# Patient Record
Sex: MALE | Race: AMERICAN INDIAN OR ALASKA NATIVE
[De-identification: names, ages, dates, MRNs, and addresses within clinical notes are randomized per-mention and may not be internally consistent; named-entity substitution may affect disease eponyms.]

---

## 2018-06-16 ENCOUNTER — HOSPITAL ENCOUNTER (EMERGENCY)
Dept: HOSPITAL 5 - ED | Age: 68
Discharge: HOME | End: 2018-06-16
Payer: COMMERCIAL

## 2018-06-16 VITALS — SYSTOLIC BLOOD PRESSURE: 118 MMHG | DIASTOLIC BLOOD PRESSURE: 64 MMHG
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
## 2018-06-16 DIAGNOSIS — J44.9: ICD-10-CM

## 2018-06-16 DIAGNOSIS — S00.83XA: Primary | ICD-10-CM
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
## 2018-06-16 LAB
BASOPHILS # (AUTO): 0 K/MM3 (ref 0–0.1)
BASOPHILS NFR BLD AUTO: 0.2 % (ref 0–1.8)
BUN SERPL-MCNC: 12 MG/DL (ref 9–20)
BUN/CREAT SERPL: 17 %
CALCIUM SERPL-MCNC: 9.3 MG/DL (ref 8.4–10.2)
EOSINOPHIL # BLD AUTO: 0 K/MM3 (ref 0–0.4)
EOSINOPHIL NFR BLD AUTO: 0.7 % (ref 0–4.3)
HCT VFR BLD CALC: 43.3 % (ref 35.5–45.6)
HEMOLYSIS INDEX: 15
HGB BLD-MCNC: 14.1 GM/DL (ref 11.8–15.2)
LYMPHOCYTES # BLD AUTO: 1.5 K/MM3 (ref 1.2–5.4)
LYMPHOCYTES NFR BLD AUTO: 23.9 % (ref 13.4–35)
MCH RBC QN AUTO: 32 PG (ref 28–32)
MCHC RBC AUTO-ENTMCNC: 33 % (ref 32–34)
MCV RBC AUTO: 97 FL (ref 84–94)
MONOCYTES # (AUTO): 0.6 K/MM3 (ref 0–0.8)
MONOCYTES % (AUTO): 10.2 % (ref 0–7.3)
PLATELET # BLD: 145 K/MM3 (ref 140–440)
RBC # BLD AUTO: 4.45 M/MM3 (ref 3.65–5.03)

## 2018-06-16 PROCEDURE — 80048 BASIC METABOLIC PNL TOTAL CA: CPT

## 2018-06-16 PROCEDURE — 70450 CT HEAD/BRAIN W/O DYE: CPT

## 2018-06-16 PROCEDURE — 90471 IMMUNIZATION ADMIN: CPT

## 2018-06-16 PROCEDURE — 71046 X-RAY EXAM CHEST 2 VIEWS: CPT

## 2018-06-16 PROCEDURE — 70486 CT MAXILLOFACIAL W/O DYE: CPT

## 2018-06-16 PROCEDURE — 93010 ELECTROCARDIOGRAM REPORT: CPT

## 2018-06-16 PROCEDURE — 93005 ELECTROCARDIOGRAM TRACING: CPT

## 2018-06-16 PROCEDURE — 99285 EMERGENCY DEPT VISIT HI MDM: CPT

## 2018-06-16 PROCEDURE — 85025 COMPLETE CBC W/AUTO DIFF WBC: CPT

## 2018-06-16 PROCEDURE — 94644 CONT INHLJ TX 1ST HOUR: CPT

## 2018-06-16 PROCEDURE — 36415 COLL VENOUS BLD VENIPUNCTURE: CPT

## 2018-06-16 PROCEDURE — 90715 TDAP VACCINE 7 YRS/> IM: CPT

## 2018-06-16 PROCEDURE — 82803 BLOOD GASES ANY COMBINATION: CPT

## 2018-06-16 NOTE — EMERGENCY DEPARTMENT REPORT
ED General Adult HPI





- General


Chief complaint: Dyspnea/Respdistress


Stated complaint: DIFFICULTY IN BREATHING


Time Seen by Provider: 18 08:13


Source: patient, EMS (ems notes not available at time of  chart dictation), RN 

notes reviewed


Mode of arrival: Stretcher


Limitations: No Limitations





- History of Present Illness


Initial comments: 





This is a 67-year-old male who is unknown to this provider previously, oxygen 

dependent, has a past medical history of COPD, hypertension.  Patient presents 

to the ER with complaint of lip pain and swelling after mechanical fall 

yesterday.  He denies severe headache, chest pain, abdominal pain, midline neck 

pain, weakness, numbness.  He has chronic shortness of breath, which is not a 

new, worsening or different.





He denies DVT, pulmonary embolus risk factors.





he has chronic cough which is not a new, worsening or different, and he is not 

bringing up any significant mucus


-: Sudden


Location: face, mouth


Quality: aching


Consistency: constant


Improves with: rest


Worsens with: movement


Associated Symptoms: cough, shortness of breath.  denies: confusion, chest pain

, diaphoresis, fever/chills, headaches, loss of appetite, malaise, nausea/

vomiting, rash, seizure, syncope, weakness





- Related Data


 Previous Rx's











 Medication  Instructions  Recorded  Last Taken  Type


 


Acetaminophen [Tylenol Arthritis] 650 mg PO Q6HR PRN #30 tablet.er 18 

Unknown Rx


 


Albuterol Sulfate [Proair 90 mcg IH Q4HR PRN #2 aer.pow.ba 18 Unknown Rx





Respiclick]    


 


Bacitracin Zinc Oint [Antibiotic 1 applicatio TP BID #1 tube 18 Unknown Rx





Oint]    


 


Ibuprofen [Motrin] 600 mg PO Q8H PRN #30 tablet 18 Unknown Rx


 


Ipratropium Bromide [Atrovent Hfa] 12.9 gm IH Q4HR #2 hfa.aer.ad 18 

Unknown Rx











 Allergies











Allergy/AdvReac Type Severity Reaction Status Date / Time


 


No Known Allergies Allergy   Unverified 18 06:06














ED Review of Systems


ROS: 


Stated complaint: DIFFICULTY IN BREATHING


Other details as noted in HPI





Comment: All other systems reviewed and negative





ED Past Medical Hx





- Social History


Smoking Status: Unknown if ever smoked


Substance Use Type: None





- Medications


Home Medications: 


 Home Medications











 Medication  Instructions  Recorded  Confirmed  Last Taken  Type


 


Acetaminophen [Tylenol Arthritis] 650 mg PO Q6HR PRN #30 tablet.er 18  

Unknown Rx


 


Albuterol Sulfate [Proair 90 mcg IH Q4HR PRN #2 aer.pow.ba 18  Unknown Rx





Respiclick]     


 


Bacitracin Zinc Oint [Antibiotic 1 applicatio TP BID #1 tube 18  Unknown 

Rx





Oint]     


 


Ibuprofen [Motrin] 600 mg PO Q8H PRN #30 tablet 18  Unknown Rx


 


Ipratropium Bromide [Atrovent Hfa] 12.9 gm IH Q4HR #2 hfa.aer.ad 18  

Unknown Rx














ED Physical Exam





- General


Limitations: No Limitations


General appearance: alert, in no apparent distress





- Head


Head exam: Present: atraumatic, normocephalic





- Eye


Eye exam: Present: normal appearance, PERRL, EOMI.  Absent: nystagmus





- ENT


ENT exam: Present: mucous membranes moist, TM's normal bilaterally, normal 

external ear exam, other (there is no mastoid tenderness.  Tympanic membranes 

clear on the right side, left external auditory canal is obscured by wax.).  

Absent: normal exam (superficial superior and inferior lip swelling is noted.  

No obvious laceration.  No induration.  Crust-like discharge noted on the 

superior frenulum.), normal orophraynx





- Neck


Neck exam: Present: normal inspection, full ROM.  Absent: tenderness, 

meningismus





- Respiratory


Respiratory exam: Present: normal lung sounds bilaterally, decreased breath 

sounds.  Absent: respiratory distress, wheezes, rales, rhonchi, stridor





- Cardiovascular


Cardiovascular Exam: Present: regular rate, normal rhythm, normal heart sounds.

  Absent: bradycardia, tachycardia, irregular rhythm, systolic murmur, 

diastolic murmur, rubs, gallop





- GI/Abdominal


GI/Abdominal exam: Present: soft, normal bowel sounds.  Absent: distended, 

tenderness, guarding, rebound, rigid, pulsatile mass





- Rectal


Rectal exam: Present: deferred





- Extremities Exam


Extremities exam: Present: normal inspection, full ROM, normal capillary refill

, other (there is no palpable cord.  There is negative Homans sign.).  Absent: 

pedal edema, calf tenderness





- Back Exam


Back exam: Present: normal inspection, full ROM.  Absent: tenderness, CVA 

tenderness (R), paraspinal tenderness, vertebral tenderness





- Neurological Exam


Neurological exam: Present: alert, oriented X3, CN II-XII intact, normal gait, 

other (Extraocular movements intact.  Tongue midline.  No facial droop.  Facial 

sensation intact to light touch in the V1, V2, V3 distribution bilaterally.  5 

and 5 strength in 4 extremities..  Sensation is intact to light touch in 4 

extremities.).  Absent: motor sensory deficit





- Psychiatric


Psychiatric exam: Present: normal affect, normal mood





- Skin


Skin exam: Present: warm, dry, intact, normal color.  Absent: rash





ED Course


 Vital Signs











  18





  05:30 05:46 06:00


 


Temperature   


 


Pulse Rate  72 80


 


Pulse Rate [   





Anterior   





Bilateral   





Throughout]   


 


Respiratory  22 27 H





Rate   


 


Respiratory   





Rate [Anterior   





Bilateral   





Throughout]   


 


Blood Pressure  162/66 163/65


 


Blood Pressure   





[Left]   


 


O2 Sat by Pulse 96 97 96





Oximetry   














  18





  06:16 06:30 06:46


 


Temperature   


 


Pulse Rate 84 81 


 


Pulse Rate [   





Anterior   





Bilateral   





Throughout]   


 


Respiratory 28 H 21 17





Rate   


 


Respiratory   





Rate [Anterior   





Bilateral   





Throughout]   


 


Blood Pressure 163/65 163/65 


 


Blood Pressure   





[Left]   


 


O2 Sat by Pulse 97 95 100





Oximetry   














  18





  06:58 06:59 07:39


 


Temperature 97.8 F  98.5 F


 


Pulse Rate  102 H 103 H


 


Pulse Rate [   





Anterior   





Bilateral   





Throughout]   


 


Respiratory   32 H





Rate   


 


Respiratory   





Rate [Anterior   





Bilateral   





Throughout]   


 


Blood Pressure   


 


Blood Pressure   117/85





[Left]   


 


O2 Sat by Pulse   100





Oximetry   














  18





  09:10 10:02


 


Temperature  


 


Pulse Rate  


 


Pulse Rate [ 86 102 H





Anterior  





Bilateral  





Throughout]  


 


Respiratory  





Rate  


 


Respiratory 20 20





Rate [Anterior  





Bilateral  





Throughout]  


 


Blood Pressure  


 


Blood Pressure  





[Left]  


 


O2 Sat by Pulse  





Oximetry  














- Reevaluation(s)


Reevaluation #1: 





18 10:11


Differential diagnosis, including not limited to: Chronic COPD, contusions, 

intracranial injury, facial fracture














Assessment and plan: This 67-year-old male with a primary complaint of 

traumatic lip swelling.  He is afebrile with reassuring vital signs, has a GCS 

of 15, with an NIH score of 0 and is clinically sober.Patient is clinically 

sober at this time.  The cervical spine is cleared through nexus and [place] c 

spine rule








Noncontrast CT scan of the brain and face did not demonstrate any significant 

traumatic abnormalities that would require transfer or emergent surgical 

intervention.  The patient is not having left-sided ear pain or left-sided 

mastoid tenderness, bilateral mastoids are nontender, clinically therefore 

patient's does not have mastoiditis.








The patient is chronically short of breath but is not acutely decompensated, 

his arterial blood gas to me showed mild hypercapnia but he was not 

encephalopathic, and he is not wheezing.  The patient will be discharged with 

as needed albuterol, Atrovent, pain medication, warm compresses, and 

bacitracin.  He was also given an a tetanus vaccination.  He can follow up with 

an outpatient ENT or facial plastics person for his multiple incidental CT scan 

findings.

















ED Medical Decision Making





- Lab Data


Result diagrams: 


 18 06:24





 18 06:24








 Vital Signs











  18





  05:30 05:46 06:00


 


Temperature   


 


Pulse Rate  72 80


 


Pulse Rate [   





Anterior   





Bilateral   





Throughout]   


 


Respiratory  22 27 H





Rate   


 


Respiratory   





Rate [Anterior   





Bilateral   





Throughout]   


 


Blood Pressure  162/66 163/65


 


Blood Pressure   





[Left]   


 


O2 Sat by Pulse 96 97 96





Oximetry   














  18





  06:16 06:30 06:46


 


Temperature   


 


Pulse Rate 84 81 


 


Pulse Rate [   





Anterior   





Bilateral   





Throughout]   


 


Respiratory 28 H 21 17





Rate   


 


Respiratory   





Rate [Anterior   





Bilateral   





Throughout]   


 


Blood Pressure 163/65 163/65 


 


Blood Pressure   





[Left]   


 


O2 Sat by Pulse 97 95 100





Oximetry   














  18





  06:58 06:59 07:39


 


Temperature 97.8 F  98.5 F


 


Pulse Rate  102 H 103 H


 


Pulse Rate [   





Anterior   





Bilateral   





Throughout]   


 


Respiratory   32 H





Rate   


 


Respiratory   





Rate [Anterior   





Bilateral   





Throughout]   


 


Blood Pressure   


 


Blood Pressure   117/85





[Left]   


 


O2 Sat by Pulse   100





Oximetry   














  18





  09:10 10:02


 


Temperature  


 


Pulse Rate  


 


Pulse Rate [ 86 102 H





Anterior  





Bilateral  





Throughout]  


 


Respiratory  





Rate  


 


Respiratory 20 20





Rate [Anterior  





Bilateral  





Throughout]  


 


Blood Pressure  


 


Blood Pressure  





[Left]  


 


O2 Sat by Pulse  





Oximetry  











 Lab Results











  18 Range/Units





  06:24 06:24 09:15 


 


WBC   6.4   (4.5-11.0)  K/mm3


 


RBC   4.45   (3.65-5.03)  M/mm3


 


Hgb   14.1   (11.8-15.2)  gm/dl


 


Hct   43.3   (35.5-45.6)  %


 


MCV   97 H   (84-94)  fl


 


MCH   32   (28-32)  pg


 


MCHC   33   (32-34)  %


 


RDW   12.6 L   (13.2-15.2)  %


 


Plt Count   145   (140-440)  K/mm3


 


Lymph % (Auto)   23.9   (13.4-35.0)  %


 


Mono % (Auto)   10.2 H   (0.0-7.3)  %


 


Eos % (Auto)   0.7   (0.0-4.3)  %


 


Baso % (Auto)   0.2   (0.0-1.8)  %


 


Lymph #   1.5   (1.2-5.4)  K/mm3


 


Mono #   0.6   (0.0-0.8)  K/mm3


 


Eos #   0.0   (0.0-0.4)  K/mm3


 


Baso #   0.0   (0.0-0.1)  K/mm3


 


Seg Neutrophils %   65.0   (40.0-70.0)  %


 


Seg Neutrophils #   4.1   (1.8-7.7)  K/mm3


 


POC ABG pH    7.389  (7.35-7.45)  


 


POC ABG pCO2    61.2 H  (35-45)  


 


POC ABG pO2    91  ()  


 


POC ABG HCO3    37.0  


 


POC ABG Total CO2    39  


 


POC ABG O2 Sat    97  


 


POC ABG Base Excess    12  


 


FiO2    32  %


 


Sodium  138    (137-145)  mmol/L


 


Potassium  4.0    (3.6-5.0)  mmol/L


 


Chloride  94.4 L    ()  mmol/L


 


Carbon Dioxide  35 H    (22-30)  mmol/L


 


Anion Gap  13    mmol/L


 


BUN  12    (9-20)  mg/dL


 


Creatinine  0.7 L    (0.8-1.5)  mg/dL


 


Estimated GFR  > 60    ml/min


 


BUN/Creatinine Ratio  17    %


 


Glucose  98    ()  mg/dL


 


Calcium  9.3    (8.4-10.2)  mg/dL














- EKG Data


-: EKG Interpreted by Me





- EKG Data


When compared to previous EKG there are: previous EKG unavailable





18 10:11


Normal sinus, 99 bpm, left axis deviation, a chill enlargement, motion artifact

, left anterior fascicular block, abnormal EKG, not a stemi





- Radiology Data


Radiology results: report reviewed, image reviewed





Print Report


Referring Physician:   ANGELA CAM


Patient Name:   MERCED ALANIS


Patient ID:   [id number]


YOB: 1950


Sex:   Male


Accession:   [id number]


Report Date:   2018


Report Status:   Finalized


Findings


[facility] 


[address] [place] [address] 





Cat Scan Report 


Signed 





Patient: MERCED ALANIS MR#: [id number] 


: 1950 Acct:[id number] 


Age/Sex: 67 / M ADM Date: 18 


Loc: ED 


Attending Dr: 








Ordering Physician: ANGELA CAM MD 


Date of Service: 18 


Procedure(s): CT head/brain wo con 


Accession Number(s): [id number] 





cc: ANGELA CAM MD 








CT HEAD WITHOUT CONTRAST 





INDICATION: Fall. 





COMPARISON: None similar. 





FINDINGS: Noncontrast head CT demonstrates normal ventricles and sulci 


without acute infarct, hemorrhage, mass effect or midline shift. No 


abnormal extra axial fluid collections. Mild periventricular and few 


white matter hypodense small vessel ischemic disease. Grossly normal 


posterior fossa with preserved basilar cisterns. 





Normal imaged eye globes. Clear paranasal sinuses and right mastoid 


air cells. Extensive left mastoid air cell opacification. Mild 


atherosclerotic ICA calcifications. Nasal soft tissue swelling though 


possible with few small nonspecific radiodensities/possible foreign 


bodies, amongst others, as on axial series 3, images 3-5. Intact 


calvarium. Normal scalp. Edentulous jaw. Cervical spondylosis. 





CONCLUSION: Severe left mastoiditis without acute intracranial CT 


abnormality, as described. Nasal injury suspected, as described. 


Please correlate. 





Thank you for the opportunity to participate in this patient's care. 





Transcribed By: RS 


Dictated By: RACIEL TAPIA MD 


Electronically Authenticated By: RACIEL TAPIA MD 


Signed Date/Time: 18 0916 





Print Report


Referring Physician:   ANGELA CAM


Patient Name:   MERCED ALANIS


Patient ID:   [id number]


YOB: 1950


Sex:   Male


Accession:   [id number]


Report Date:   2018


Report Status:   Finalized


Findings


[facility] 


[address] [place] [id number] 





Cat Scan Report 


Signed 





Patient: MERCED ALANIS MR#: [id number] 


: 1950 Acct:[id number] 


Age/Sex: 67 / M ADM Date: 18 


Loc: ED 


Attending Dr: 








Ordering Physician: ANGELA CAM MD 


Date of Service: 18 


Procedure(s): CT facial bones wo con 


Accession Number(s): [id number] 





cc: ANGELA CAM MD 








CT FACIAL BONES WITHOUT CONTRAST: 





INDICATION: Fall. Facial trauma, swelling. 





COMPARISON: None similar. 





FINDINGS: Noncontrast axial, sagittal and coronal CT reconstructions 


through the face suggests soft tissue swelling of the nose and upper 


lip/anterior face, right slightly more than left. Numerous nonspecific 


superficial subcutaneous and bilateral malar 


hyperdensities/calcifications incidentally noted as on axial series 2, 


images 13-40. Few small nasal soft tissue radiodensities as on axial 


images 38 and 39 also nonspecific. Nasal bone grossly intact as also 


the remainder facial bones. Preserved airway. Mild left maxillary 


sinus mucosal nasal thickening inferiorly. Clear remainder anterior 


paranasal sinuses and right mastoid air cells. Left mastoid air cells 


completely opacified with minimal mucosal thickening in the left middle 


ear posteriorly also possible as on axial image 83, series 3. Normal 


eye globes and imaged intracranial appearance. Slight leftward nasal 


septal deviation and approximately 4 mm leftward nasal septal spur as 


on coronal image 41. Patent ostiomeatal complexes. Intact TMJs. 


Cervical spondylosis. 





CONCLUSION: No acute significantly displaced facial fractures, though 


nose/anterior face soft tissue posttraumatic swelling suspected with 


severe left mastoiditis/slight otitis media and mild left maxillary 


sinus mucosal thickening incidentally noted, as described. Please 


correlate. 





Thank you for the opportunity to participate in this patient's care. 





Transcribed By: RS 


Dictated By: RACIEL TAPIA MD 


Electronically Authenticated By: RACIEL TAPIA MD 


Signed Date/Time: 18 











DD/DT: 18 


TD/TT: 18





Print Report


Referring Physician:   ED DOC


Patient Name:   MERCED ALANIS


Patient ID:   [id number]


YOB: 1950


Sex:   Male


Accession:   [id number]


Report Date:   2018


Report Status:   Finalized


Findings


[facility] 


[address] [place] [address] 





XRay Report 


Signed 





Patient: MERCED ALANIS MR#: [id number] 


: 1950 Acct:[id number] 


Age/Sex: 67 / M ADM Date: 18 


Loc: ED 


Attending Dr: 








Ordering Physician: SHERITA CONNORS MD 


Date of Service: 18 


Procedure(s): XR chest routine 2V 


Accession Number(s): [id number] 





cc: SHERITA CONNORS MD 





Fluoro Time In Minutes: 





FINAL REPORT 





PROCEDURE: XR CHEST ROUTINE 2V 





TECHNIQUE: PA and lateral chest radiographs were obtained. CPT 


72794 











HISTORY: Shortness of breath 





COMPARISON: No prior studies are available for comparison. 





FINDINGS: 


Heart: Normal. 





Mediastinum/Vessels: Normal. 





Lungs/Pleural space: Normal. 





Bony thorax: No acute osseous abnormality. 





Other: 





IMPRESSION: 


There is no evidence of an acute cardiopulmonary process.. 











Transcribed By: [facility] 


Dictated By: MAIRA ABEL MD 


Electronically Authenticated By: MAIRA ABEL MD 


Signed Date/Time: 18 











Critical care attestation.: 


If time is entered above; I have spent that time in minutes in the direct care 

of this critically ill patient, excluding procedure time.








ED Disposition


Clinical Impression: 


 History of COPD, Facial contusion





Disposition: DC-01 TO HOME OR SELFCARE


Is pt being admited?: No


Does the pt Need Aspirin: No


Condition: Stable


Instructions:  Chronic Bronchitis (ED), Contusion in Adults (ED)


Additional Instructions: 


Rest, and avoid heavy lifting and avoid strenuous physical activity.  Continue 

home oxygen therapy, take the medications as needed/directed.  Apply warm 

compresses to swollen lip.  Use albuterol, Atrovent as needed for cough, 

wheezing, shortness of breath as directed.  CT scan of the brain, facial bones 

demonstrated no fracture or significant injury, however multiple incidental 

nonemergent findings were noted, which should be followed up within the next 4-

6 weeks.  Therefore, follow-up with either a facial plastic surgeon, or 

otolaryngology specialist for these findings within the recommended timeframe.


Dr. Tracey is a local otolaryngologist.


Dr. Christensen is a local plastic surgeon.


Follow up with a pulmonary specialist within the next 4-6 weeks.  Dr. Espinoza is 

a local pulmonary specialist.  Follow-up with primary care doctor within the 

next 4-6 weeks.  Dr. Ackerman is a local primary care doctor.














Return to the ER right away with new pain, worsened pain, migration of pain, 

fevers, chills, lethargy, irritability, projectile vomiting, change in mental 

status, confusion, inability to tolerate liquid feeds.























Referrals: 


PRIMARY CARE,MD [Primary Care Provider] - 3-5 Days


SRINIVASAN PETERSEN MD [Staff Physician] - 3-5 Days


NATALYA CHRISTENSEN MD [Staff Physician] - 3-5 Days


DIANE ACKERMAN MD [Staff Physician] - 3-5 Days


ROMAN ESPINOZA MD [Staff Physician] - 3-5 Days

## 2018-06-16 NOTE — CAT SCAN REPORT
CT HEAD WITHOUT CONTRAST



INDICATION: Fall.



COMPARISON: None similar.



FINDINGS: Noncontrast head CT demonstrates normal ventricles and sulci 

without acute infarct, hemorrhage, mass effect or midline shift.  No 

abnormal extra axial fluid collections.  Mild periventricular and few 

white matter hypodense small vessel ischemic disease.  Grossly normal 

posterior fossa with preserved basilar cisterns.



Normal imaged eye globes.  Clear paranasal sinuses and right mastoid 

air cells.  Extensive left mastoid air cell opacification.  Mild 

atherosclerotic ICA calcifications.  Nasal soft tissue swelling though 

possible with few small nonspecific radiodensities/possible foreign 

bodies, amongst others, as on axial series 3, images 3-5.  Intact 

calvarium.  Normal scalp.  Edentulous jaw.  Cervical spondylosis.



CONCLUSION: Severe left mastoiditis without acute intracranial CT 

abnormality, as described.  Nasal injury suspected, as described.  

Please correlate.



Thank you for the opportunity to participate in this patient's care.

## 2018-06-16 NOTE — XRAY REPORT
FINAL REPORT



PROCEDURE:  XR CHEST ROUTINE 2V



TECHNIQUE:  PA and lateral chest radiographs were obtained. CPT

93620







HISTORY:  Shortness of breath 



COMPARISON:  No prior studies are available for comparison.



FINDINGS:  

Heart: Normal.



Mediastinum/Vessels: Normal.



Lungs/Pleural space: Normal.



Bony thorax: No acute osseous abnormality.



Other: 



IMPRESSION:  

There is no evidence of an acute cardiopulmonary process..

## 2019-06-15 ENCOUNTER — HOSPITAL ENCOUNTER (INPATIENT)
Dept: HOSPITAL 5 - ED | Age: 69
LOS: 5 days | Discharge: NURSING, REHAB OR LONG-TERM CARE | DRG: 189 | End: 2019-06-20
Attending: INTERNAL MEDICINE | Admitting: INTERNAL MEDICINE
Payer: MEDICARE

## 2019-06-15 DIAGNOSIS — Z87.891: ICD-10-CM

## 2019-06-15 DIAGNOSIS — Z23: ICD-10-CM

## 2019-06-15 DIAGNOSIS — J43.9: ICD-10-CM

## 2019-06-15 DIAGNOSIS — D69.6: ICD-10-CM

## 2019-06-15 DIAGNOSIS — J20.9: ICD-10-CM

## 2019-06-15 DIAGNOSIS — Z99.81: ICD-10-CM

## 2019-06-15 DIAGNOSIS — J96.01: Primary | ICD-10-CM

## 2019-06-15 LAB
BAND NEUTROPHILS # (MANUAL): 0 K/MM3
BUN SERPL-MCNC: 12 MG/DL (ref 9–20)
BUN/CREAT SERPL: 17 %
CALCIUM SERPL-MCNC: 9.3 MG/DL (ref 8.4–10.2)
HCT VFR BLD CALC: 41.5 % (ref 35.5–45.6)
HEMOLYSIS INDEX: 25
HGB BLD-MCNC: 14.1 GM/DL (ref 11.8–15.2)
INR PPP: 1.19 (ref 0.87–1.13)
MCHC RBC AUTO-ENTMCNC: 34 % (ref 32–34)
MCV RBC AUTO: 95 FL (ref 84–94)
MYELOCYTES # (MANUAL): 0 K/MM3
PLATELET # BLD: 108 K/MM3 (ref 140–440)
PROMYELOCYTES # (MANUAL): 0 K/MM3
RBC # BLD AUTO: 4.39 M/MM3 (ref 3.65–5.03)
TOTAL CELLS COUNTED BLD: 100

## 2019-06-15 PROCEDURE — 36600 WITHDRAWAL OF ARTERIAL BLOOD: CPT

## 2019-06-15 PROCEDURE — 71045 X-RAY EXAM CHEST 1 VIEW: CPT

## 2019-06-15 PROCEDURE — 80048 BASIC METABOLIC PNL TOTAL CA: CPT

## 2019-06-15 PROCEDURE — 93010 ELECTROCARDIOGRAM REPORT: CPT

## 2019-06-15 PROCEDURE — 85007 BL SMEAR W/DIFF WBC COUNT: CPT

## 2019-06-15 PROCEDURE — 82550 ASSAY OF CK (CPK): CPT

## 2019-06-15 PROCEDURE — 83735 ASSAY OF MAGNESIUM: CPT

## 2019-06-15 PROCEDURE — 36415 COLL VENOUS BLD VENIPUNCTURE: CPT

## 2019-06-15 PROCEDURE — 94760 N-INVAS EAR/PLS OXIMETRY 1: CPT

## 2019-06-15 PROCEDURE — 93005 ELECTROCARDIOGRAM TRACING: CPT

## 2019-06-15 PROCEDURE — 82803 BLOOD GASES ANY COMBINATION: CPT

## 2019-06-15 PROCEDURE — 85610 PROTHROMBIN TIME: CPT

## 2019-06-15 PROCEDURE — 90732 PPSV23 VACC 2 YRS+ SUBQ/IM: CPT

## 2019-06-15 PROCEDURE — 85025 COMPLETE CBC W/AUTO DIFF WBC: CPT

## 2019-06-15 PROCEDURE — 94640 AIRWAY INHALATION TREATMENT: CPT

## 2019-06-15 RX ADMIN — Medication SCH ML: at 23:11

## 2019-06-15 RX ADMIN — IPRATROPIUM BROMIDE AND ALBUTEROL SULFATE SCH: .5; 3 SOLUTION RESPIRATORY (INHALATION) at 22:50

## 2019-06-15 NOTE — EMERGENCY DEPARTMENT REPORT
ED Shortness of Breath HPI





- General


Chief Complaint: Dyspnea/Respdistress


Stated Complaint: TRELL


Time Seen by Provider: 06/15/19 20:47


Source: EMS (ems notes not available at time of  chart dictation), RN notes 

reviewed, old records reviewed


Mode of arrival: Stretcher


Limitations: Physical Limitation





- History of Present Illness


Initial Comments: 





This is a 68-year-old gentleman.  The patient states he does not have a local 

primary care doctor that he can recall.  He believes that his primary 

pulmonologist is Dr. Canada





His past medical history includes COPD, home oxygen dependent, hypertension





The patient presents to the emergency room today with a complaint of painless 

cough, wheezing, mucus production and shortness of breath.  He denies DVT, 

pulmonary embolus risk factors.  He is treated with albuterol, Atrovent, 

steroids.  He feels improved.  He endorses coughing so much that he has right-

sided neck pain, and difficulty with swallowing food.











MD Complaint: shortness of breath, cough


-: Gradual


Severity: moderate


Consistency: constant


Improves With: oxygen, rest, bronchodilators, upright position, medication


Worsens With: lying flat, exertion, eating


Known History Of: COPD





- Related Data


                                  Previous Rx's











 Medication  Instructions  Recorded  Last Taken  Type


 


Acetaminophen [Tylenol Arthritis] 650 mg PO Q6HR PRN #30 tablet.er 06/16/18 

Unknown Rx


 


Albuterol Sulfate [Proair 90 mcg IH Q4HR PRN #2 aer.pow.ba 06/16/18 Unknown Rx





Respiclick]    


 


Bacitracin Zinc Oint [Antibiotic 1 applicatio TP BID #1 tube 06/16/18 Unknown Rx





Oint]    


 


Ibuprofen [Motrin] 600 mg PO Q8H PRN #30 tablet 06/16/18 Unknown Rx


 


Ipratropium Bromide [Atrovent Hfa] 12.9 gm IH Q4HR #2 hfa.aer.ad 06/16/18 

Unknown Rx











                                    Allergies











Allergy/AdvReac Type Severity Reaction Status Date / Time


 


No Known Allergies Allergy   Unverified 06/16/18 06:06














ED Review of Systems


ROS: 


Stated complaint: TRELL


Other details as noted in HPI





Constitutional: malaise.  denies: fever


Eyes: denies: eye discharge


ENT: throat pain, congestion


Respiratory: cough, shortness of breath, SOB with exertion, SOB at rest, 

wheezing


Cardiovascular: dyspnea on exertion


Gastrointestinal: denies: nausea, vomiting


Genitourinary: denies: dysuria


Musculoskeletal: arthralgia


Skin: denies: lesions


Neurological: weakness


Psychiatric: anxiety





ED Past Medical Hx





- Past Medical History


Hx COPD: Yes


Additional medical history: emphysema





- Social History


Smoking Status: Former Smoker


Substance Use Type: Alcohol





- Medications


Home Medications: 


                                Home Medications











 Medication  Instructions  Recorded  Confirmed  Last Taken  Type


 


Acetaminophen [Tylenol Arthritis] 650 mg PO Q6HR PRN #30 tablet.er 06/16/18  

Unknown Rx


 


Albuterol Sulfate [Proair 90 mcg IH Q4HR PRN #2 aer.pow.ba 06/16/18  Unknown Rx





Respiclick]     


 


Bacitracin Zinc Oint [Antibiotic 1 applicatio TP BID #1 tube 06/16/18  Unknown 

Rx





Oint]     


 


Ibuprofen [Motrin] 600 mg PO Q8H PRN #30 tablet 06/16/18  Unknown Rx


 


Ipratropium Bromide [Atrovent Hfa] 12.9 gm IH Q4HR #2 hfa.aer.ad 06/16/18  

Unknown Rx














ED Physical Exam





- General


Limitations: Physical Limitation


General appearance: alert, in distress





- Head


Head exam: Present: atraumatic, normocephalic





- Eye


Eye exam: Present: normal appearance, EOMI.  Absent: nystagmus





- ENT


ENT exam: Present: normal orophraynx, mucous membranes moist, normal external 

ear exam, other (patient is edentulous.  Speaking in full sentences.  There is 

no stridor.  There is no elevation of the base of the tongue.)





- Neck


Neck exam: Present: normal inspection, full ROM.  Absent: tenderness, 

meningismus





- Respiratory


Respiratory exam: Present: respiratory distress, wheezes, rhonchi, accessory 

muscle use, decreased breath sounds





- Cardiovascular


Cardiovascular Exam: Present: regular rate, normal rhythm, normal heart sounds. 

Absent: bradycardia, tachycardia, irregular rhythm, systolic murmur, diastolic 

murmur, rubs, gallop





- GI/Abdominal


GI/Abdominal exam: Present: soft.  Absent: distended, tenderness, guarding, 

rebound, rigid, pulsatile mass





- Rectal


Rectal exam: Present: deferred





- Extremities Exam


Extremities exam: Present: normal inspection, full ROM, other (2+ pulses noted 

in the bilateral upper, lower extremities.  Compartments soft.  No long bony 

tenderness.  The pelvis is stable.).  Absent: pedal edema, calf tenderness





- Back Exam


Back exam: Present: normal inspection, full ROM.  Absent: tenderness, CVA 

tenderness (R), CVA tenderness (L), paraspinal tenderness, vertebral tenderness





- Neurological Exam


Neurological exam: Present: alert, other (Extraocular movements intact.  Tongue 

midline.  No facial droop.  Facial sensation intact to light touch in the V1, 

V2, V3 distribution bilaterally.  5 and 5 strength in 4 extremities..  Sensation

is intact to light touch in 4 extremities.).  Absent: motor sensory deficit





- Psychiatric


Psychiatric exam: Present: anxious





- Skin


Skin exam: Present: warm, dry, intact, normal color.  Absent: rash





ED Course


                                   Vital Signs











  06/15/19 06/15/19 06/15/19





  20:22 20:26 20:30


 


Temperature  98.2 F 


 


Pulse Rate 95 H 106 H 109 H


 


Pulse Rate [   102 H





Posterior   





Bilateral   





Throughout]   


 


Respiratory  24 21





Rate   


 


Respiratory   18





Rate [Posterior   





Bilateral   





Throughout]   


 


Blood Pressure   136/92


 


Blood Pressure  136/92 





[Left]   


 


O2 Sat by Pulse  99 99





Oximetry   














  06/15/19 06/15/19 06/15/19





  20:46 21:00 21:15


 


Temperature   


 


Pulse Rate 105 H 102 H 105 H


 


Pulse Rate [   





Posterior   





Bilateral   





Throughout]   


 


Respiratory 19 11 L 22





Rate   


 


Respiratory   





Rate [Posterior   





Bilateral   





Throughout]   


 


Blood Pressure 129/85 129/94 126/86


 


Blood Pressure   





[Left]   


 


O2 Sat by Pulse 99 96 96





Oximetry   














  06/15/19 06/15/19 06/15/19





  21:30 21:32 21:46


 


Temperature   


 


Pulse Rate 106 H  104 H


 


Pulse Rate [  107 H 





Posterior   





Bilateral   





Throughout]   


 


Respiratory 19  





Rate   


 


Respiratory  22 





Rate [Posterior   





Bilateral   





Throughout]   


 


Blood Pressure 136/83  136/84


 


Blood Pressure   





[Left]   


 


O2 Sat by Pulse   95





Oximetry   














- Reevaluation(s)


Reevaluation #1: 





06/15/19 22:14


We'll defer to inpatient team to evaluate leukopenia and thrombocytopenia.





ED Medical Decision Making





- Lab Data


Result diagrams: 


                                 06/15/19 20:40





                                 06/15/19 20:40








                                   Vital Signs











  06/15/19 06/15/19 06/15/19





  20:22 20:26 20:30


 


Temperature  98.2 F 


 


Pulse Rate 95 H 106 H 109 H


 


Pulse Rate [   102 H





Posterior   





Bilateral   





Throughout]   


 


Respiratory  24 21





Rate   


 


Respiratory   18





Rate [Posterior   





Bilateral   





Throughout]   


 


Blood Pressure   136/92


 


Blood Pressure  136/92 





[Left]   


 


O2 Sat by Pulse  99 99





Oximetry   














  06/15/19 06/15/19 06/15/19





  20:46 21:00 21:15


 


Temperature   


 


Pulse Rate 105 H 102 H 105 H


 


Pulse Rate [   





Posterior   





Bilateral   





Throughout]   


 


Respiratory 19 11 L 22





Rate   


 


Respiratory   





Rate [Posterior   





Bilateral   





Throughout]   


 


Blood Pressure 129/85 129/94 126/86


 


Blood Pressure   





[Left]   


 


O2 Sat by Pulse 99 96 96





Oximetry   














  06/15/19 06/15/19 06/15/19





  21:30 21:32 21:46


 


Temperature   


 


Pulse Rate 106 H  104 H


 


Pulse Rate [  107 H 





Posterior   





Bilateral   





Throughout]   


 


Respiratory 19  





Rate   


 


Respiratory  22 





Rate [Posterior   





Bilateral   





Throughout]   


 


Blood Pressure 136/83  136/84


 


Blood Pressure   





[Left]   


 


O2 Sat by Pulse   95





Oximetry   











                                   Lab Results











  06/15/19 06/15/19 06/15/19 Range/Units





  20:40 20:40 21:10 


 


WBC  3.2 L    (4.5-11.0)  K/mm3


 


RBC  4.39    (3.65-5.03)  M/mm3


 


Hgb  14.1    (11.8-15.2)  gm/dl


 


Hct  41.5    (35.5-45.6)  %


 


MCV  95 H    (84-94)  fl


 


MCH  32    (28-32)  pg


 


MCHC  34    (32-34)  %


 


RDW  12.2 L    (13.2-15.2)  %


 


Plt Count  108 L    (140-440)  K/mm3


 


Lymph % (Auto)  Np    


 


Add Manual Diff  Complete    


 


Total Counted  100    


 


Seg Neutrophils %  Np    


 


Seg Neuts % (Manual)  34.0 L    (40.0-70.0)  %


 


Band Neutrophils %  0    %


 


Lymphocytes % (Manual)  52.0 H    (13.4-35.0)  %


 


Reactive Lymphs % (Man)  0    %


 


Monocytes % (Manual)  13.0 H    (0.0-7.3)  %


 


Eosinophils % (Manual)  1.0    (0.0-4.3)  %


 


Basophils % (Manual)  0    (0.0-1.8)  %


 


Metamyelocytes %  0    %


 


Myelocytes %  0    %


 


Promyelocytes %  0    %


 


Blast Cells %  0    %


 


Nucleated RBC %  Not Reportable    


 


Seg Neutrophils # Man  1.1 L    (1.8-7.7)  K/mm3


 


Band Neutrophils #  0.0    K/mm3


 


Lymphocytes # (Manual)  1.7    (1.2-5.4)  K/mm3


 


Abs React Lymphs (Man)  0.0    K/mm3


 


Monocytes # (Manual)  0.4    (0.0-0.8)  K/mm3


 


Eosinophils # (Manual)  0.0    (0.0-0.4)  K/mm3


 


Basophils # (Manual)  0.0    (0.0-0.1)  K/mm3


 


Metamyelocytes #  0.0    K/mm3


 


Myelocytes #  0.0    K/mm3


 


Promyelocytes #  0.0    K/mm3


 


Blast Cells #  0.0    K/mm3


 


WBC Morphology  Not Reportable    


 


Hypersegmented Neuts  Not Reportable    


 


Hyposegmented Neuts  Not Reportable    


 


Hypogranular Neuts  Not Reportable    


 


Smudge Cells  Not Reportable    


 


Toxic Granulation  Not Reportable    


 


Toxic Vacuolation  Not Reportable    


 


Dohle Bodies  Not Reportable    


 


Pelger-Huet Anomaly  Not Reportable    


 


Doug Rods  Not Reportable    


 


Platelet Estimate  Appears decreased    


 


Clumped Platelets  Not Reportable    


 


Plt Clumps, EDTA  Not Reportable    


 


Large Platelets  Not Reportable    


 


Giant Platelets  Not Reportable    


 


Platelet Satelliting  Not Reportable    


 


Plt Morphology Comment  Not Reportable    


 


RBC Morphology  Not Reportable    


 


Dimorphic RBCs  Not Reportable    


 


Polychromasia  Not Reportable    


 


Hypochromasia  Not Reportable    


 


Poikilocytosis  Not Reportable    


 


Anisocytosis  Not Reportable    


 


Microcytosis  Not Reportable    


 


Macrocytosis  Not Reportable    


 


Spherocytes  Not Reportable    


 


Pappenheimer Bodies  Not Reportable    


 


Sickle Cells  Not Reportable    


 


Target Cells  Not Reportable    


 


Tear Drop Cells  Not Reportable    


 


Ovalocytes  Few    


 


Helmet Cells  Not Reportable    


 


[place] Bodies  Not Reportable    


 


Cabot Rings  Not Reportable    


 


Devils Elbow Cells  Not Reportable    


 


Bite Cells  Not Reportable    


 


Crenated Cell  Not Reportable    


 


Elliptocytes  Not Reportable    


 


Acanthocytes (Spur)  Not Reportable    


 


Rouleaux  Not Reportable    


 


Hemoglobin C Crystals  Not Reportable    


 


Schistocytes  Not Reportable    


 


Malaria parasites  Not Reportable    


 


Babak Bodies  Not Reportable    


 


Hem Pathologist Commnt  No    


 


PT    14.8  (12.2-14.9)  Sec.


 


INR    1.19 H  (0.87-1.13)  


 


Sodium   135 L   (137-145)  mmol/L


 


Potassium   4.3   (3.6-5.0)  mmol/L


 


Chloride   93.0 L   ()  mmol/L


 


Carbon Dioxide   31 H   (22-30)  mmol/L


 


Anion Gap   15   mmol/L


 


BUN   12   (9-20)  mg/dL


 


Creatinine   0.7 L   (0.8-1.5)  mg/dL


 


Estimated GFR   > 60   ml/min


 


BUN/Creatinine Ratio   17   %


 


Glucose   86   ()  mg/dL


 


Calcium   9.3   (8.4-10.2)  mg/dL


 


Magnesium     (1.7-2.3)  mg/dL


 


Total Creatine Kinase     ()  units/L














  06/15/19 Range/Units





  21:10 


 


WBC   (4.5-11.0)  K/mm3


 


RBC   (3.65-5.03)  M/mm3


 


Hgb   (11.8-15.2)  gm/dl


 


Hct   (35.5-45.6)  %


 


MCV   (84-94)  fl


 


MCH   (28-32)  pg


 


MCHC   (32-34)  %


 


RDW   (13.2-15.2)  %


 


Plt Count   (140-440)  K/mm3


 


Lymph % (Auto)   


 


Add Manual Diff   


 


Total Counted   


 


Seg Neutrophils %   


 


Seg Neuts % (Manual)   (40.0-70.0)  %


 


Band Neutrophils %   %


 


Lymphocytes % (Manual)   (13.4-35.0)  %


 


Reactive Lymphs % (Man)   %


 


Monocytes % (Manual)   (0.0-7.3)  %


 


Eosinophils % (Manual)   (0.0-4.3)  %


 


Basophils % (Manual)   (0.0-1.8)  %


 


Metamyelocytes %   %


 


Myelocytes %   %


 


Promyelocytes %   %


 


Blast Cells %   %


 


Nucleated RBC %   


 


Seg Neutrophils # Man   (1.8-7.7)  K/mm3


 


Band Neutrophils #   K/mm3


 


Lymphocytes # (Manual)   (1.2-5.4)  K/mm3


 


Abs React Lymphs (Man)   K/mm3


 


Monocytes # (Manual)   (0.0-0.8)  K/mm3


 


Eosinophils # (Manual)   (0.0-0.4)  K/mm3


 


Basophils # (Manual)   (0.0-0.1)  K/mm3


 


Metamyelocytes #   K/mm3


 


Myelocytes #   K/mm3


 


Promyelocytes #   K/mm3


 


Blast Cells #   K/mm3


 


WBC Morphology   


 


Hypersegmented Neuts   


 


Hyposegmented Neuts   


 


Hypogranular Neuts   


 


Smudge Cells   


 


Toxic Granulation   


 


Toxic Vacuolation   


 


Dohle Bodies   


 


Pelger-Huet Anomaly   


 


Doug Rods   


 


Platelet Estimate   


 


Clumped Platelets   


 


Plt Clumps, EDTA   


 


Large Platelets   


 


Giant Platelets   


 


Platelet Satelliting   


 


Plt Morphology Comment   


 


RBC Morphology   


 


Dimorphic RBCs   


 


Polychromasia   


 


Hypochromasia   


 


Poikilocytosis   


 


Anisocytosis   


 


Microcytosis   


 


Macrocytosis   


 


Spherocytes   


 


Pappenheimer Bodies   


 


Sickle Cells   


 


Target Cells   


 


Tear Drop Cells   


 


Ovalocytes   


 


Helmet Cells   


 


[place] Bodies   


 


Cabot Rings   


 


Devils Elbow Cells   


 


Bite Cells   


 


Crenated Cell   


 


Elliptocytes   


 


Acanthocytes (Spur)   


 


Rouleaux   


 


Hemoglobin C Crystals   


 


Schistocytes   


 


Malaria parasites   


 


Babak Bodies   


 


Hem Pathologist Commnt   


 


PT   (12.2-14.9)  Sec.


 


INR   (0.87-1.13)  


 


Sodium   (137-145)  mmol/L


 


Potassium   (3.6-5.0)  mmol/L


 


Chloride   ()  mmol/L


 


Carbon Dioxide   (22-30)  mmol/L


 


Anion Gap   mmol/L


 


BUN   (9-20)  mg/dL


 


Creatinine   (0.8-1.5)  mg/dL


 


Estimated GFR   ml/min


 


BUN/Creatinine Ratio   %


 


Glucose   ()  mg/dL


 


Calcium   (8.4-10.2)  mg/dL


 


Magnesium  2.20  (1.7-2.3)  mg/dL


 


Total Creatine Kinase  103  ()  units/L














- EKG Data


-: EKG Interpreted by Me


EKG shows normal: sinus rhythm


Rate: tachycardia





- EKG Data


When compared to previous EKG there are: no significant change





06/15/19 22:12


This is a sinus tachycardia, 100 bpm, normal axis, QTC prolonged, poor R 

progression, atrial enlargement, motion artifact, no endorsement of chest pain, 

this is an abnormal EKG, it is not consistent with ST elevation myocardial 

infarction, appears grossly unchanged from prior EKG from 06/16/2018, with the 

exception of resolved left axis deviation, and resolved left anterior fascicular

 block.





- Radiology Data


Radiology results: report reviewed, image reviewed





X-ray of the chest is negative for acute disease.  Chronic findings noted.  

Emphysematous findings noted.





- Medical Decision Making





Differential diagnosis, including but not limited to: Bronchitis, pneumonia, 

COPD exacerbation





Assessment and plan: 68-year-old gentleman who is ill-appearing, known history 

of COPD, with cough, wheezing, shortness of breath, retractions.  He has no DVT 

or pulmonary embolus risk factors.  He is low risk by well's criteria.  He is 

improved clinically.  Still having difficulty breathing, we will admit to the 

medical service for COPD exacerbation.  Dr. Abrams of the medical service to 

admit the patient.


Critical care attestation.: 


If time is entered above; I have spent that time in minutes in the direct care 

of this critically ill patient, excluding procedure time.








ED Disposition


Clinical Impression: 


 COPD exacerbation





Disposition: DC-09 OP ADMIT IP TO THIS HOSP


Is pt being admited?: Yes


Condition: Fair


Instructions:  Chronic Obstructive Pulmonary Disease (ED)


Referrals: 


[facility], [place] [Primary Care Provider] - 3-5 Days

## 2019-06-15 NOTE — HISTORY AND PHYSICAL REPORT
<SAINT-CLINT,HURLINE - Last Filed: 06/16/19 03:28>





History of Present Illness


Date of examination: 06/15/19


Date of admission: 


06/15/2019


Chief complaint: 





Shortness of breath


History of present illness: 





Patient is a 68-year-old male with PMHx of COPD (home O2 dependent), 

hypertension, tobacco use disorder who presents to the ER with complaints of 

productive cough, wheezing, shortness of breath 2 days.  Patient states that he

had been having trouble breathing at home, he took his nebulizer treatment 

without relief of his symptoms, patient also reports that the symptoms are 

associated with shortness of breath, cough.  Patient states that he had been  

coughing for 2 days with whitish sputum production, he complains of increase 

shortness of breath with activities, chest congestion that has been going on for

a week. Patient also reports problem swallowing solid food that is concerning to

him, denies sore throat, denies hemoptysis, denies chest pain, denies seizure, 

denies chills.  He reports that he follow with pulmonologist is Dr. Canada for 

his lungs problem. Patient was evaluated in the ER and admitted for further 

evaluation of his pulmonary symptoms. 


  





Past History


Past Medical History: hypertension


Past Surgical History: No surgical history


Social history: smoking (since age 15, quit 6 months ago)


Family history: no significant family history





Medications and Allergies


                                    Allergies











Allergy/AdvReac Type Severity Reaction Status Date / Time


 


No Known Allergies Allergy   Unverified 06/16/18 06:06











                                Home Medications











 Medication  Instructions  Recorded  Confirmed  Last Taken  Type


 


Acetaminophen [Tylenol Arthritis] 650 mg PO Q6HR PRN #30 tablet.er 06/16/18 06/16/19 Unknown Rx


 


Albuterol Sulfate [Proair 90 mcg IH Q4HR PRN #2 aer.pow.ba 06/16/18 06/16/19 2 

Days Ago Rx





Respiclick]    ~06/14/19 


 


Ibuprofen [Motrin] 600 mg PO Q8H PRN #30 tablet 06/16/18 06/16/19 Unknown Rx


 


Ipratropium Bromide [Atrovent Hfa] 12.9 gm IH Q4HR #2 hfa.aer.ad 06/16/18 06/16/19 2 Days Ago Rx





    ~06/14/19 











Active Meds: 


Active Medications





Acetaminophen (Tylenol)  650 mg PO Q4H PRN


   PRN Reason: Pain MILD(1-3)/Fever >100.5/HA


Albuterol/Ipratropium (Duoneb *Not For Prn Use*)  1 ampul IH Q6HRT [facility]


Methylprednisolone Sodium Succinate (Solu-Medrol)  80 mg IV Q6HR [facility]


Ondansetron HCl (Zofran)  4 mg IV Q8H PRN


   PRN Reason: Nausea And Vomiting


Sodium Chloride (Sodium Chloride Flush Syringe 10 Ml)  10 ml IV BID [facility]


Sodium Chloride (Sodium Chloride Flush Syringe 10 Ml)  10 ml IV PRN PRN


   PRN Reason: LINE FLUSH











Review of Systems


Respiratory: cough, cough with sputum, shortness of breath, dyspnea on exertion,

congestion





Exam





- Constitutional


Vitals: 


                                        











Temp Pulse Resp BP Pulse Ox


 


 98.2 F   104 H  22   136/84   95 


 


 06/15/19 20:26  06/15/19 21:46  06/15/19 21:32  06/15/19 21:46  06/15/19 21:46











General appearance: Present: no acute distress, mild distress, cachectic





- EENT


Eyes: Present: EOM intact


ENT: hearing intact





- Neck


Neck: Present: supple, normal ROM





- Respiratory


Respiratory effort: labored





- Cardiovascular


Heart Sounds: Present: S1 & S2





- Extremities


Extremities: no ischemia


Peripheral Pulses: within normal limits





- Abdominal


General gastrointestinal: Present: deferred


Male genitourinary: Present: deferred





- Rectal


Rectal Exam: deferred





- Integumentary


Integumentary: Present: clear, warm, dry





- Musculoskeletal


Musculoskeletal: strength equal bilaterally





- Psychiatric


Psychiatric: cooperative





- Neurologic


Neurologic: moves all extremities





Results





- Labs


CBC & Chem 7: 


                                 06/15/19 20:40





                                 06/15/19 20:40


Labs: 


                             Laboratory Last Values











WBC  3.2 K/mm3 (4.5-11.0)  L  06/15/19  20:40    


 


RBC  4.39 M/mm3 (3.65-5.03)   06/15/19  20:40    


 


Hgb  14.1 gm/dl (11.8-15.2)   06/15/19  20:40    


 


Hct  41.5 % (35.5-45.6)   06/15/19  20:40    


 


MCV  95 fl (84-94)  H  06/15/19  20:40    


 


MCH  32 pg (28-32)   06/15/19  20:40    


 


MCHC  34 % (32-34)   06/15/19  20:40    


 


RDW  12.2 % (13.2-15.2)  L  06/15/19  20:40    


 


Plt Count  108 K/mm3 (140-440)  L  06/15/19  20:40    


 


Lymph % (Auto)  Np   06/15/19  20:40    


 


Add Manual Diff  Complete   06/15/19  20:40    


 


Total Counted  100   06/15/19  20:40    


 


Seg Neutrophils %  Np   06/15/19  20:40    


 


Seg Neuts % (Manual)  34.0 % (40.0-70.0)  L  06/15/19  20:40    


 


  0 %  06/15/19  20:40    


 


  52.0 % (13.4-35.0)  H  06/15/19  20:40    


 


Reactive Lymphs % (Man)  0 %  06/15/19  20:40    


 


  13.0 % (0.0-7.3)  H  06/15/19  20:40    


 


  1.0 % (0.0-4.3)   06/15/19  20:40    


 


  0 % (0.0-1.8)   06/15/19  20:40    


 


  0 %  06/15/19  20:40    


 


  0 %  06/15/19  20:40    


 


  0 %  06/15/19  20:40    


 


  0 %  06/15/19  20:40    


 


Nucleated RBC %  Not Reportable   06/15/19  20:40    


 


Seg Neutrophils # Man  1.1 K/mm3 (1.8-7.7)  L  06/15/19  20:40    


 


Band Neutrophils #  0.0 K/mm3  06/15/19  20:40    


 


  1.7 K/mm3 (1.2-5.4)   06/15/19  20:40    


 


Abs React Lymphs (Man)  0.0 K/mm3  06/15/19  20:40    


 


  0.4 K/mm3 (0.0-0.8)   06/15/19  20:40    


 


  0.0 K/mm3 (0.0-0.4)   06/15/19  20:40    


 


  0.0 K/mm3 (0.0-0.1)   06/15/19  20:40    


 


  0.0 K/mm3  06/15/19  20:40    


 


  0.0 K/mm3  06/15/19  20:40    


 


  0.0 K/mm3  06/15/19  20:40    


 


Blast Cells #  0.0 K/mm3  06/15/19  20:40    


 


WBC Morphology  Not Reportable   06/15/19  20:40    


 


Hypersegmented Neuts  Not Reportable   06/15/19  20:40    


 


Hyposegmented Neuts  Not Reportable   06/15/19  20:40    


 


Hypogranular Neuts  Not Reportable   06/15/19  20:40    


 


  Not Reportable   06/15/19  20:40    


 


  Not Reportable   06/15/19  20:40    


 


  Not Reportable   06/15/19  20:40    


 


  Not Reportable   06/15/19  20:40    


 


  Not Reportable   06/15/19  20:40    


 


  Not Reportable   06/15/19  20:40    


 


  Appears decreased   06/15/19  20:40    


 


  Not Reportable   06/15/19  20:40    


 


Plt Clumps, EDTA  Not Reportable   06/15/19  20:40    


 


  Not Reportable   06/15/19  20:40    


 


  Not Reportable   06/15/19  20:40    


 


  Not Reportable   06/15/19  20:40    


 


Plt Morphology Comment  Not Reportable   06/15/19  20:40    


 


RBC Morphology  Not Reportable   06/15/19  20:40    


 


Dimorphic RBCs  Not Reportable   06/15/19  20:40    


 


  Not Reportable   06/15/19  20:40    


 


  Not Reportable   06/15/19  20:40    


 


  Not Reportable   06/15/19  20:40    


 


  Not Reportable   06/15/19  20:40    


 


  Not Reportable   06/15/19  20:40    


 


  Not Reportable   06/15/19  20:40    


 


  Not Reportable   06/15/19  20:40    


 


  Not Reportable   06/15/19  20:40    


 


  Not Reportable   06/15/19  20:40    


 


  Not Reportable   06/15/19  20:40    


 


  Not Reportable   06/15/19  20:40    


 


  Few   06/15/19  20:40    


 


  Not Reportable   06/15/19  20:40    


 


  Not Reportable   06/15/19  20:40    


 


  Not Reportable   06/15/19  20:40    


 


  Not Reportable   06/15/19  20:40    


 


  Not Reportable   06/15/19  20:40    


 


  Not Reportable   06/15/19  20:40    


 


  Not Reportable   06/15/19  20:40    


 


Acanthocytes (Spur)  Not Reportable   06/15/19  20:40    


 


Rouleaux  Not Reportable   06/15/19  20:40    


 


  Not Reportable   06/15/19  20:40    


 


  Not Reportable   06/15/19  20:40    


 


  Not Reportable   06/15/19  20:40    


 


  Not Reportable   06/15/19  20:40    


 


Hem Pathologist Commnt  No   06/15/19  20:40    


 


PT  14.8 Sec. (12.2-14.9)   06/15/19  21:10    


 


INR  1.19  (0.87-1.13)  H  06/15/19  21:10    


 


Sodium  135 mmol/L (137-145)  L  06/15/19  20:40    


 


Potassium  4.3 mmol/L (3.6-5.0)   06/15/19  20:40    


 


Chloride  93.0 mmol/L ()  L  06/15/19  20:40    


 


Carbon Dioxide  31 mmol/L (22-30)  H  06/15/19  20:40    


 


  15 mmol/L  06/15/19  20:40    


 


BUN  12 mg/dL (9-20)   06/15/19  20:40    


 


  0.7 mg/dL (0.8-1.5)  L  06/15/19  20:40    


 


Estimated GFR  > 60 ml/min  06/15/19  20:40    


 


  17 %  06/15/19  20:40    


 


Glucose  86 mg/dL ()   06/15/19  20:40    


 


Calcium  9.3 mg/dL (8.4-10.2)   06/15/19  20:40    


 


Magnesium  2.20 mg/dL (1.7-2.3)   06/15/19  21:10    


 


  103 units/L ()   06/15/19  21:10    














Assessment and Plan


Assessment and plan: 





1. COPD/emphysema with acute exacerbation


2. Hypoxia due to above


3. Acute dyspnea


4. HTN (BP stable)


5. H/o tobbaco used disorder


6.  Thrombocytopenia





Plan:


Pt is admitted to [facility] for COPD


Continue nebulizer treatmemt PRN for SOB


Pulmocort daily


O2 to keep sat > 92%


Solumedrol 80mg Q6hr 


Cough supressent with mucinex


Resume home meds


Plan of care was d/w pt, voiced understanding


Pt's condition and plan of care of care discussed with 


Advance Directives: Yes


VTE prophylaxis?: Mechanical


Contraindication Mechanical VTE Prophylaxis: Contraindicated


Plan of care discussed with patient/family: Yes





<VICENTE GUILLEN DONATO - Last Filed: 06/16/19 06:01>





History of Present Illness


Date of admission: 


06/15/19 22:33








Medications and Allergies


Active Meds: 


Active Medications





Acetaminophen (Tylenol)  650 mg PO Q4H PRN


   PRN Reason: Pain MILD(1-3)/Fever >100.5/HA


   Last Admin: 06/16/19 03:01 Dose:  650 mg


   Documented by: 


Albuterol/Ipratropium (Duoneb *Not For Prn Use*)  1 ampul IH Q6HRT [facility]


   Last Admin: 06/16/19 02:48 Dose:  1 ampul


   Documented by: 


Methylprednisolone Sodium Succinate (Solu-Medrol)  80 mg IV Q6HR [facility]


   Last Admin: 06/16/19 05:06 Dose:  80 mg


   Documented by: 


Ondansetron HCl (Zofran)  4 mg IV Q8H PRN


   PRN Reason: Nausea And Vomiting


Pneumococcal Polyvalent Vaccine (Pneumovax 23)  0.5 ml IM .ONCE ONE


   Stop: 06/16/19 12:01


Sodium Chloride (Sodium Chloride Flush Syringe 10 Ml)  10 ml IV BID [facility]


   Last Admin: 06/15/19 23:11 Dose:  10 ml


   Documented by: 


Sodium Chloride (Sodium Chloride Flush Syringe 10 Ml)  10 ml IV PRN PRN


   PRN Reason: LINE FLUSH











Exam





- Constitutional


Vitals: 


                                        











Temp Pulse Resp BP Pulse Ox


 


 98.0 F   102 H  20   128/92   96 


 


 06/15/19 23:54  06/16/19 02:57  06/16/19 02:57  06/16/19 00:02  06/16/19 02:56














Results





- Labs


CBC & Chem 7: 


                                 06/15/19 20:40





                                 06/15/19 20:40


Labs: 


                             Laboratory Last Values











WBC  3.2 K/mm3 (4.5-11.0)  L  06/15/19  20:40    


 


RBC  4.39 M/mm3 (3.65-5.03)   06/15/19  20:40    


 


Hgb  14.1 gm/dl (11.8-15.2)   06/15/19  20:40    


 


Hct  41.5 % (35.5-45.6)   06/15/19  20:40    


 


MCV  95 fl (84-94)  H  06/15/19  20:40    


 


MCH  32 pg (28-32)   06/15/19  20:40    


 


MCHC  34 % (32-34)   06/15/19  20:40    


 


RDW  12.2 % (13.2-15.2)  L  06/15/19  20:40    


 


Plt Count  108 K/mm3 (140-440)  L  06/15/19  20:40    


 


Lymph % (Auto)  Np   06/15/19  20:40    


 


Add Manual Diff  Complete   06/15/19  20:40    


 


Total Counted  100   06/15/19  20:40    


 


Seg Neutrophils %  Np   06/15/19  20:40    


 


Seg Neuts % (Manual)  34.0 % (40.0-70.0)  L  06/15/19  20:40    


 


  0 %  06/15/19  20:40    


 


  52.0 % (13.4-35.0)  H  06/15/19  20:40    


 


Reactive Lymphs % (Man)  0 %  06/15/19  20:40    


 


  13.0 % (0.0-7.3)  H  06/15/19  20:40    


 


  1.0 % (0.0-4.3)   06/15/19  20:40    


 


  0 % (0.0-1.8)   06/15/19  20:40    


 


  0 %  06/15/19  20:40    


 


  0 %  06/15/19  20:40    


 


  0 %  06/15/19  20:40    


 


  0 %  06/15/19  20:40    


 


Nucleated RBC %  Not Reportable   06/15/19  20:40    


 


Seg Neutrophils # Man  1.1 K/mm3 (1.8-7.7)  L  06/15/19  20:40    


 


Band Neutrophils #  0.0 K/mm3  06/15/19  20:40    


 


  1.7 K/mm3 (1.2-5.4)   06/15/19  20:40    


 


Abs React Lymphs (Man)  0.0 K/mm3  06/15/19  20:40    


 


  0.4 K/mm3 (0.0-0.8)   06/15/19  20:40    


 


  0.0 K/mm3 (0.0-0.4)   06/15/19  20:40    


 


  0.0 K/mm3 (0.0-0.1)   06/15/19  20:40    


 


  0.0 K/mm3  06/15/19  20:40    


 


  0.0 K/mm3  06/15/19  20:40    


 


  0.0 K/mm3  06/15/19  20:40    


 


Blast Cells #  0.0 K/mm3  06/15/19  20:40    


 


WBC Morphology  Not Reportable   06/15/19  20:40    


 


Hypersegmented Neuts  Not Reportable   06/15/19  20:40    


 


Hyposegmented Neuts  Not Reportable   06/15/19  20:40    


 


Hypogranular Neuts  Not Reportable   06/15/19  20:40    


 


  Not Reportable   06/15/19  20:40    


 


  Not Reportable   06/15/19  20:40    


 


  Not Reportable   06/15/19  20:40    


 


  Not Reportable   06/15/19  20:40    


 


  Not Reportable   06/15/19  20:40    


 


  Not Reportable   06/15/19  20:40    


 


  Appears decreased   06/15/19  20:40    


 


  Not Reportable   06/15/19  20:40    


 


Plt Clumps, EDTA  Not Reportable   06/15/19  20:40    


 


  Not Reportable   06/15/19  20:40    


 


  Not Reportable   06/15/19  20:40    


 


  Not Reportable   06/15/19  20:40    


 


Plt Morphology Comment  Not Reportable   06/15/19  20:40    


 


RBC Morphology  Not Reportable   06/15/19  20:40    


 


Dimorphic RBCs  Not Reportable   06/15/19  20:40    


 


  Not Reportable   06/15/19  20:40    


 


  Not Reportable   06/15/19  20:40    


 


  Not Reportable   06/15/19  20:40    


 


  Not Reportable   06/15/19  20:40    


 


  Not Reportable   06/15/19  20:40    


 


  Not Reportable   06/15/19  20:40    


 


  Not Reportable   06/15/19  20:40    


 


  Not Reportable   06/15/19  20:40    


 


  Not Reportable   06/15/19  20:40    


 


  Not Reportable   06/15/19  20:40    


 


  Not Reportable   06/15/19  20:40    


 


  Few   06/15/19  20:40    


 


  Not Reportable   06/15/19  20:40    


 


  Not Reportable   06/15/19  20:40    


 


  Not Reportable   06/15/19  20:40    


 


  Not Reportable   06/15/19  20:40    


 


  Not Reportable   06/15/19  20:40    


 


  Not Reportable   06/15/19  20:40    


 


  Not Reportable   06/15/19  20:40    


 


Acanthocytes (Spur)  Not Reportable   06/15/19  20:40    


 


Rouleaux  Not Reportable   06/15/19  20:40    


 


  Not Reportable   06/15/19  20:40    


 


  Not Reportable   06/15/19  20:40    


 


  Not Reportable   06/15/19  20:40    


 


  Not Reportable   06/15/19  20:40    


 


Hem Pathologist Commnt  No   06/15/19  20:40    


 


PT  14.8 Sec. (12.2-14.9)   06/15/19  21:10    


 


INR  1.19  (0.87-1.13)  H  06/15/19  21:10    


 


Sodium  135 mmol/L (137-145)  L  06/15/19  20:40    


 


Potassium  4.3 mmol/L (3.6-5.0)   06/15/19  20:40    


 


Chloride  93.0 mmol/L ()  L  06/15/19  20:40    


 


Carbon Dioxide  31 mmol/L (22-30)  H  06/15/19  20:40    


 


  15 mmol/L  06/15/19  20:40    


 


BUN  12 mg/dL (9-20)   06/15/19  20:40    


 


  0.7 mg/dL (0.8-1.5)  L  06/15/19  20:40    


 


Estimated GFR  > 60 ml/min  06/15/19  20:40    


 


  17 %  06/15/19  20:40    


 


Glucose  86 mg/dL ()   06/15/19  20:40    


 


Calcium  9.3 mg/dL (8.4-10.2)   06/15/19  20:40    


 


Magnesium  2.20 mg/dL (1.7-2.3)   06/15/19  21:10    


 


  103 units/L ()   06/15/19  21:10    














Assessment and Plan


Assessment and plan: 


68-year-old man with a history of COPD, no hypertension emergency room with 

complaints of shortness of breath consistent with COPD exacerbation.  Physical e

xam significant for wheezing, good with an estimated above, patient seen and 

examined, discussed with nurse practitioner

## 2019-06-15 NOTE — XRAY REPORT
PROCEDURE: XR CHEST 1V AP 

 

TECHNIQUE:  Chest radiograph single view.  

 

HISTORY: Dyspnea 

 

COMPARISONS: Comparison is June 16, 2018 FINDINGS: 

 

Heart: Normal. 

Mediastinum/Vessels: Normal. 

Lungs/Pleural space: Lungs are hyperinflated with flattening of the diaphragm. No focal infiltrate id
entified 

Bony thorax: No acute osseous abnormality. 

Life support devices: None. 

 

IMPRESSION:  Findings most consistent with COPD. 

 

This document is electronically signed by Yoseph Samson MD., Mya 15 2019 09:21:43 PM ET

## 2019-06-16 PROCEDURE — 3E0234Z INTRODUCTION OF SERUM, TOXOID AND VACCINE INTO MUSCLE, PERCUTANEOUS APPROACH: ICD-10-PCS | Performed by: INTERNAL MEDICINE

## 2019-06-16 RX ADMIN — Medication SCH ML: at 23:09

## 2019-06-16 RX ADMIN — IBUPROFEN PRN MG: 600 TABLET, FILM COATED ORAL at 23:09

## 2019-06-16 RX ADMIN — IPRATROPIUM BROMIDE AND ALBUTEROL SULFATE SCH AMPUL: .5; 3 SOLUTION RESPIRATORY (INHALATION) at 13:30

## 2019-06-16 RX ADMIN — IPRATROPIUM BROMIDE AND ALBUTEROL SULFATE SCH AMPUL: .5; 3 SOLUTION RESPIRATORY (INHALATION) at 07:52

## 2019-06-16 RX ADMIN — IBUPROFEN PRN MG: 600 TABLET, FILM COATED ORAL at 17:49

## 2019-06-16 RX ADMIN — IPRATROPIUM BROMIDE AND ALBUTEROL SULFATE SCH AMPUL: .5; 3 SOLUTION RESPIRATORY (INHALATION) at 02:48

## 2019-06-16 RX ADMIN — Medication SCH ML: at 11:35

## 2019-06-16 RX ADMIN — IPRATROPIUM BROMIDE AND ALBUTEROL SULFATE SCH AMPUL: .5; 3 SOLUTION RESPIRATORY (INHALATION) at 20:09

## 2019-06-16 NOTE — PROGRESS NOTE
Assessment and Plan





/COPD/emphysema with acute exacerbation


Continue nebulizer treatmemt PRN for SOB


Pulmocort daily


O2 to keep sat > 92%


Solumedrol 80mg Q6hr 


Cough supressent with mucinex





/Acute hypoxic respiratory failure


-Due to COPD exacerbation


- Continue to treat underlying cause, supplemental O2


 


/ HTN (BP stable), monitor BP every shift





/ H/o tobbaco used disorder


- Counseled for cessation





/The Thrombocytopenia


- Monitor CBC








Brief History:





Patient is a 68-year-old male with PMHx of COPD (home O2 dependent), hyper

tension, tobacco use disorder who presents to the ER with complaints of 

productive cough, wheezing, shortness of breath 2 days. 








Radiological data: 


Chest x-ray: Findings consistent with COPD





Hospitalist Physical exam:


GENERAL:  well-developed elderly male lying on bed appeared to be in no 

discomfort. 


HEENT: Normocephalic.  Atraumatic.  No conjunctival congestion or icterus. 

Patient has moist mucous membranes.


NECK: Supple.  Trachea midline.


CHEST/LUNGS: Diminished breath sound auscultated bilaterally, breathing 

nonlabored. No wheezes crackles or rhonchi.


HEART/CARDIOVASCULAR: Regular in rate and rhythm.  S1 and S2 positive.


ABDOMEN: Abdomen is soft, nontender.  Patient has normal bowel sounds.  


SKIN: There is no rash.  Warm and dry.


NEURO:  No focal motor deficit.  Follows command.


MUSCULOSKELETAL: No joint effusion or tenderness.


EXTRIMITY: No edema, no cyanosis or clubbing.


PSYCH:  Cooperative.








Subjective


Date of service: 06/16/19


Interval history: 





Patient seen and examined.  Medical records and medication list reviewed.  


No acute event overnight noted by the RN.  


Patient denies any chest pain or difficulty breathing.  Patient is tolerating 

diet.  


Discussed plan of care at bedside with patient.








Objective





- Constitutional


Vitals: 


                               Vital Signs - 12hr











  06/16/19 06/16/19 06/16/19





  04:21 07:52 07:53


 


Temperature 97.8 F  98.2 F


 


Pulse Rate 88  92 H


 


Pulse Rate [   





From Monitor]   


 


Pulse Rate [  100 H 





Posterior   





Bilateral   





Throughout]   


 


Respiratory 18  18





Rate   


 


Respiratory  20 





Rate [Posterior   





Bilateral   





Throughout]   


 


Blood Pressure 105/65  114/90


 


O2 Sat by Pulse 96  98





Oximetry   














  06/16/19 06/16/19 06/16/19





  08:01 08:02 10:41


 


Temperature   


 


Pulse Rate   


 


Pulse Rate [   92 H





From Monitor]   


 


Pulse Rate [ 98 H  





Posterior   





Bilateral   





Throughout]   


 


Respiratory   18





Rate   


 


Respiratory 20  





Rate [Posterior   





Bilateral   





Throughout]   


 


Blood Pressure   


 


O2 Sat by Pulse  96 98





Oximetry   














  06/16/19 06/16/19





  11:46 13:31


 


Temperature 97.0 F L 


 


Pulse Rate 91 H 


 


Pulse Rate [  





From Monitor]  


 


Pulse Rate [  101 H





Posterior  





Bilateral  





Throughout]  


 


Respiratory 18 





Rate  


 


Respiratory  18





Rate [Posterior  





Bilateral  





Throughout]  


 


Blood Pressure 124/82 


 


O2 Sat by Pulse 97 





Oximetry  














- Labs


CBC & Chem 7: 


                                 06/15/19 20:40





                                 06/15/19 20:40


Labs: 


                              Abnormal lab results











  06/15/19 06/15/19 06/15/19 Range/Units





  20:40 20:40 21:10 


 


WBC  3.2 L    (4.5-11.0)  K/mm3


 


MCV  95 H    (84-94)  fl


 


RDW  12.2 L    (13.2-15.2)  %


 


Plt Count  108 L    (140-440)  K/mm3


 


Seg Neuts % (Manual)  34.0 L    (40.0-70.0)  %


 


Lymphocytes % (Manual)  52.0 H    (13.4-35.0)  %


 


Monocytes % (Manual)  13.0 H    (0.0-7.3)  %


 


Seg Neutrophils # Man  1.1 L    (1.8-7.7)  K/mm3


 


INR    1.19 H  (0.87-1.13)  


 


Sodium   135 L   (137-145)  mmol/L


 


Chloride   93.0 L   ()  mmol/L


 


Carbon Dioxide   31 H   (22-30)  mmol/L


 


Creatinine   0.7 L   (0.8-1.5)  mg/dL

## 2019-06-17 RX ADMIN — Medication SCH ML: at 21:36

## 2019-06-17 RX ADMIN — GUAIFENESIN SCH MG: 600 TABLET ORAL at 23:06

## 2019-06-17 RX ADMIN — Medication SCH ML: at 09:15

## 2019-06-17 RX ADMIN — IBUPROFEN PRN MG: 600 TABLET, FILM COATED ORAL at 21:35

## 2019-06-17 RX ADMIN — ENOXAPARIN SODIUM SCH MG: 100 INJECTION SUBCUTANEOUS at 21:35

## 2019-06-17 RX ADMIN — IPRATROPIUM BROMIDE AND ALBUTEROL SULFATE SCH AMPUL: .5; 3 SOLUTION RESPIRATORY (INHALATION) at 09:34

## 2019-06-17 RX ADMIN — IPRATROPIUM BROMIDE AND ALBUTEROL SULFATE SCH: .5; 3 SOLUTION RESPIRATORY (INHALATION) at 17:19

## 2019-06-17 RX ADMIN — IPRATROPIUM BROMIDE AND ALBUTEROL SULFATE SCH: .5; 3 SOLUTION RESPIRATORY (INHALATION) at 15:09

## 2019-06-17 RX ADMIN — IPRATROPIUM BROMIDE AND ALBUTEROL SULFATE SCH: .5; 3 SOLUTION RESPIRATORY (INHALATION) at 19:34

## 2019-06-17 RX ADMIN — IPRATROPIUM BROMIDE AND ALBUTEROL SULFATE SCH AMPUL: .5; 3 SOLUTION RESPIRATORY (INHALATION) at 02:37

## 2019-06-17 RX ADMIN — IPRATROPIUM BROMIDE AND ALBUTEROL SULFATE SCH: .5; 3 SOLUTION RESPIRATORY (INHALATION) at 01:59

## 2019-06-17 RX ADMIN — LEVOFLOXACIN SCH MLS/HR: 5 INJECTION, SOLUTION INTRAVENOUS at 17:53

## 2019-06-17 NOTE — CONSULTATION
History of Present Illness


Consult date: 06/17/19


Reason for consult: dyspnea, cough, COPD


History of present illness: 





                                              PULMONARY AND CRITICAL CARE CO

NSULTATION





DR. Saint -Fleur HU  thank you for asking us to participate in the care of this 

patient.





Patient is a 68-year-old male with PMHx of COPD (home O2 dependent), 

hypertension, tobacco use disorder who presents to the ER with complaints of 

productive cough, wheezing, shortness of breath 2 days.  Patient states that he

had been having trouble breathing at home, he took his nebulizer treatment 

without relief of his symptoms, patient also reports that the symptoms are 

associated with shortness of breath, cough.  Patient states that he had been  

coughing for 2 days with whitish sputum production, he complains of increase 

shortness of breath with activities, chest congestion that has been going on for

a week. Patient also reports problem swallowing solid food that is concerning to

him, denies sore throat, denies hemoptysis, denies chest pain, denies seizure, 

denies chills.  Patient has history of smoking  1 pack   x 40years. Stopped 

smoking 6 months ago. Denies alcohol or drug abuse. Patient is [place]. He 

worked as  and  in [employer] in [place]. Denies allergies to the

medications. Patient not . Children 1.


Patient still having some shortness of breath at rest.Patient is on 2 litres O2.

O2 saturation 96%. 


Chest ray findings consistent  with COPD.





Past History


Past Medical History: hypertension


Past Surgical History: No surgical history


Social history: smoking (since age 15, quit 6 months ago)


Family history: no significant family history





Medications and Allergies


                                    Allergies











Allergy/AdvReac Type Severity Reaction Status Date / Time


 


No Known Allergies Allergy   Unverified 06/16/18 06:06











                                Home Medications











 Medication  Instructions  Recorded  Confirmed  Last Taken  Type


 


Acetaminophen [Tylenol Arthritis] 650 mg PO Q6HR PRN #30 tablet.er 06/16/18 06/ 16/19 Unknown Rx


 


Albuterol Sulfate [Proair 90 mcg IH Q4HR PRN #2 aer.pow.ba 06/16/18 06/16/19 2 

Days Ago Rx





Respiclick]    ~06/14/19 


 


Ibuprofen [Motrin] 600 mg PO Q8H PRN #30 tablet 06/16/18 06/16/19 Unknown Rx


 


Ipratropium Bromide [Atrovent Hfa] 12.9 gm IH Q4HR #2 hfa.aer.ad 06/16/18 06/16/19 2 Days Ago Rx





    ~06/14/19 











Active Meds: 


Active Medications





Acetaminophen (Tylenol)  650 mg PO Q6H PRN


   PRN Reason: Pain


Albuterol (Proventil)  2.5 mg IH Q4HRT PRN


   PRN Reason: Shortness Of Breath


Albuterol/Ipratropium (Duoneb *Not For Prn Use*)  1 ampul IH Q4HRT [facility]


   Last Admin: 06/17/19 17:19 Dose:  Not Given


   Documented by: 


Enoxaparin Sodium (Lovenox)  40 mg SUB-Q QDAY@2200 KENDRA


Levofloxacin/Dextrose (Levaquin 750mg/150ml)  750 mg in 150 mls @ 100 mls/hr IV 

Q24H [facility]; Protocol


   Last Admin: 06/17/19 17:53 Dose:  100 mls/hr


   Documented by: 


Ibuprofen (Ibuprofen)  600 mg PO Q8H PRN


   PRN Reason: Pain


   Last Admin: 06/16/19 23:09 Dose:  600 mg


   Documented by: 


Methylprednisolone Sodium Succinate (Solu-Medrol)  40 mg IV Q6H KENDRA


Ondansetron HCl (Zofran)  4 mg IV Q8H PRN


   PRN Reason: Nausea And Vomiting


Sodium Chloride (Sodium Chloride Flush Syringe 10 Ml)  10 ml IV BID [facility]


   Last Admin: 06/17/19 09:15 Dose:  10 ml


   Documented by: 


Sodium Chloride (Sodium Chloride Flush Syringe 10 Ml)  10 ml IV PRN PRN


   PRN Reason: LINE FLUSH











Review of Systems


All systems: negative





Physical Examination


Vital signs: 


                                   Vital Signs











Pulse


 


 95 H


 


 06/15/19 20:22











General appearance: alert, appears uncomfortable


Eyes: non-icteric


ENT: oropharynx moist


Neck: supple, no JVD


Ascultation: Bilateral: diminished breath sounds, other (Prolonged expiratory 

phase.)


Cardiovascular: regular rate and rhythm


Gastrointestinal: normoactive bowel sounds, soft, non-tender


Integumentary: normal


Extremities: no cyanosis, no edema


Musculoskeletal: no deformities


Gait: poor gait


non-focal exam, pupils equal and round


anxious





Results





- Laboratory Findings


CBC and BMP: 


                                 06/15/19 20:40





                                 06/15/19 20:40


PT/INR, D-dimer











PT  14.8 Sec. (12.2-14.9)   06/15/19  21:10    


 


INR  1.19  (0.87-1.13)  H  06/15/19  21:10    








Abnormal lab findings: 


                                  Abnormal Labs











  06/15/19 06/15/19 06/15/19





  20:40 20:40 21:10


 


WBC  3.2 L  


 


MCV  95 H  


 


RDW  12.2 L  


 


Plt Count  108 L  


 


Seg Neuts % (Manual)  34.0 L  


 


Lymphocytes % (Manual)  52.0 H  


 


Monocytes % (Manual)  13.0 H  


 


Seg Neutrophils # Man  1.1 L  


 


INR    1.19 H


 


Sodium   135 L 


 


Chloride   93.0 L 


 


Carbon Dioxide   31 H 


 


Creatinine   0.7 L 














- Diagnostic Findings


Chest x-ray: report reviewed (Reported changes of COPD), image reviewed





Assessment and Plan





Patient is a 68-year-old male with PMHx of COPD (home O2 dependent), 

hypertension, tobacco use disorder who presents to the ER with complaints of 

productive cough, wheezing, shortness of breath 2 days.  Patient states that he

had been having trouble breathing at home, he took his nebulizer treatment 

without relief of his symptoms, patient also reports that the symptoms are 

associated with shortness of breath, cough.  Patient states that he had been  

coughing for 2 days with whitish sputum production, he complains of increase 

shortness of breath with activities, chest congestion that has been going on for

a week. Patient also reports problem swallowing solid food that is concerning to

him, denies sore throat, denies hemoptysis, denies chest pain, denies seizure, 

denies chills.  Patient has history of smoking  1 pack   x 40years. Stopped 

smoking 6 months ago. Denies alcohol or drug abuse. Patient is [place]. He 

worked as  and  in [employer] in [place]. Denies allergies to the

medications. Patient not . Children 1.


Patient still having some shortness of breath at rest.Patient is on 2 litres O2.

O2 saturation 96%. 


Chest ray findings consistent  with COPD.





- Patient Problems


(1) COPD exacerbation


Current Visit: Yes   Status: Acute   


Plan to address problem: 


O2 2 litres via nasal canula.


 Albuterol/Atrovent aerosol treatments q 6 hours.


 Continue I/V solumedrol.


 Continue S/C Lovenox.


 Continue Levaquin.


 Recommend GI prophylaxis.


 ABGs on O2.








(2) Acute bronchitis


Current Visit: Yes   Status: Acute   


Plan to address problem: 


Patient is on Levaquin.

## 2019-06-17 NOTE — PROGRESS NOTE
Assessment and Plan





/COPD/emphysema with acute exacerbation


Continue nebulizer treatmemt , empiric steroid, place on abx


Pulmocort daily


O2 to keep sat > 92%


Cough supressent with mucinex


consult pulmonary





/Acute hypoxic respiratory failure


-Due to COPD exacerbation


- Continue to treat underlying cause, supplemental O2


 


/ HTN (BP stable), monitor BP every shift





/ H/o tobbaco use disorder


- Counseled for cessation





/The Thrombocytopenia


- Monitor CBC





Disposition: when clinically stable in 1-2 days





Brief History:


Patient is a 68-year-old male with PMHx of COPD (home O2 dependent), 

hypertension, tobacco use disorder who presents to the ER with complaints of 

productive cough, wheezing, shortness of breath 2 days. 








Radiological data: 


Chest x-ray: Findings consistent with COPD





Hospitalist Physical exam:


GENERAL:  well-developed elderly male lying on bed appeared to be in no 

discomfort. 


HEENT: Normocephalic.  Atraumatic.  No conjunctival congestion or icterus. Patie

nt has moist mucous membranes.


NECK: Supple.  Trachea midline.


CHEST/LUNGS: Diminished breath sound auscultated bilaterally, breathing 

nonlabored. No wheezes crackles or rhonchi.


HEART/CARDIOVASCULAR: Regular in rate and rhythm.  S1 and S2 positive.


ABDOMEN: Abdomen is soft, nontender.  Patient has normal bowel sounds.  


SKIN: There is no rash.  Warm and dry.


NEURO:  No focal motor deficit.  Follows command.


MUSCULOSKELETAL: No joint effusion or tenderness.


EXTRIMITY: No edema, no cyanosis or clubbing.


PSYCH:  Cooperative.











Subjective


Date of service: 06/17/19


Interval history: 





Patient seen and examined.  Medical records and medication list reviewed.  


No acute event overnight noted by the RN.  


Patient c/o difficulty breathing.  Patient is tolerating diet.  


Discussed plan of care at bedside with patient.








Objective





- Constitutional


Vitals: 


                               Vital Signs - 12hr











  06/17/19 06/17/19 06/17/19





  03:52 08:11 09:34


 


Temperature 98.1 F 98.7 F 


 


Pulse Rate 110 H 54 L 


 


Pulse Rate [   97 H





Posterior   





Bilateral   





Throughout]   


 


Respiratory 20 18 





Rate   


 


Respiratory   16





Rate [Posterior   





Bilateral   





Throughout]   


 


Blood Pressure 124/80 139/86 


 


O2 Sat by Pulse 98 95 96





Oximetry   














  06/17/19





  09:53


 


Temperature 


 


Pulse Rate 


 


Pulse Rate [ 109 H





Posterior 





Bilateral 





Throughout] 


 


Respiratory 





Rate 


 


Respiratory 24





Rate [Posterior 





Bilateral 





Throughout] 


 


Blood Pressure 


 


O2 Sat by Pulse 





Oximetry 














- Labs


CBC & Chem 7: 


                                 06/15/19 20:40





                                 06/15/19 20:40

## 2019-06-18 LAB
BASOPHILS # (AUTO): 0 K/MM3 (ref 0–0.1)
BASOPHILS NFR BLD AUTO: 0.1 % (ref 0–1.8)
BUN SERPL-MCNC: 22 MG/DL (ref 9–20)
BUN/CREAT SERPL: 31 %
CALCIUM SERPL-MCNC: 9.8 MG/DL (ref 8.4–10.2)
EOSINOPHIL # BLD AUTO: 0 K/MM3 (ref 0–0.4)
EOSINOPHIL NFR BLD AUTO: 0 % (ref 0–4.3)
HCT VFR BLD CALC: 45.7 % (ref 35.5–45.6)
HEMOLYSIS INDEX: 8
HGB BLD-MCNC: 14.8 GM/DL (ref 11.8–15.2)
LYMPHOCYTES # BLD AUTO: 0.4 K/MM3 (ref 1.2–5.4)
LYMPHOCYTES NFR BLD AUTO: 7 % (ref 13.4–35)
MCHC RBC AUTO-ENTMCNC: 32 % (ref 32–34)
MCV RBC AUTO: 98 FL (ref 84–94)
MONOCYTES # (AUTO): 0.3 K/MM3 (ref 0–0.8)
MONOCYTES % (AUTO): 4.2 % (ref 0–7.3)
PLATELET # BLD: 126 K/MM3 (ref 140–440)
RBC # BLD AUTO: 4.68 M/MM3 (ref 3.65–5.03)

## 2019-06-18 PROCEDURE — 4A033R1 MEASUREMENT OF ARTERIAL SATURATION, PERIPHERAL, PERCUTANEOUS APPROACH: ICD-10-PCS | Performed by: INTERNAL MEDICINE

## 2019-06-18 RX ADMIN — Medication PRN ML: at 16:58

## 2019-06-18 RX ADMIN — IPRATROPIUM BROMIDE AND ALBUTEROL SULFATE SCH: .5; 3 SOLUTION RESPIRATORY (INHALATION) at 01:36

## 2019-06-18 RX ADMIN — GUAIFENESIN SCH MG: 600 TABLET ORAL at 09:36

## 2019-06-18 RX ADMIN — IPRATROPIUM BROMIDE AND ALBUTEROL SULFATE SCH: .5; 3 SOLUTION RESPIRATORY (INHALATION) at 14:47

## 2019-06-18 RX ADMIN — Medication SCH ML: at 09:36

## 2019-06-18 RX ADMIN — LEVOFLOXACIN SCH MLS/HR: 5 INJECTION, SOLUTION INTRAVENOUS at 16:58

## 2019-06-18 RX ADMIN — IPRATROPIUM BROMIDE AND ALBUTEROL SULFATE SCH: .5; 3 SOLUTION RESPIRATORY (INHALATION) at 08:59

## 2019-06-18 RX ADMIN — Medication SCH ML: at 22:22

## 2019-06-18 RX ADMIN — IPRATROPIUM BROMIDE AND ALBUTEROL SULFATE SCH AMPUL: .5; 3 SOLUTION RESPIRATORY (INHALATION) at 19:21

## 2019-06-18 RX ADMIN — GUAIFENESIN SCH MG: 600 TABLET ORAL at 22:22

## 2019-06-18 RX ADMIN — ENOXAPARIN SODIUM SCH MG: 100 INJECTION SUBCUTANEOUS at 22:22

## 2019-06-18 NOTE — PROGRESS NOTE
Assessment and Plan


Assessment and plan: 


--COPD with acute exacerbation:


Oxygen titrate O2 sats to more than 90%, nebulizers


Tapering dose of IV steroids, IV antibiotics, cough medicine


Inhalation steroids, admitted following


Evaluation for home oxygen at discharge





--Acute bronchitis; bronchodilators, oxygen


Antibiotics and supportive care





--Acute hypoxic respiratory failure


Secondary to COPD exacerbation


treat the underlying cause, supplemental O2


 


--HTN ; well controlled.  Continue current antihypertensives 


and when necessary medications 





--Ongoing tobacco use ; smoking cessation nicotine patch as needed





--The Thrombocytopenia; monitor levels





--Ambulate as tolerated


Possible discharge tomorrow if stable





Plan of care is reviewed with the patient and his nurse











History


Interval history: 


Patient seen and examined medical records reviewed


Patient feels slightly better , denies chest pain


Still complains of some congestion and cough


Vital signs noted





Hospitalist Physical





- Constitutional


Vitals: 


                                        











Temp Pulse Resp BP Pulse Ox


 


 98.2 F   75   16   109/82   97 


 


 06/18/19 09:30  06/18/19 09:30  06/18/19 09:30  06/18/19 09:30  06/18/19 09:30











General appearance: Present: no acute distress, mild distress, cachectic





- EENT


Eyes: Present: PERRL, EOM intact





- Neck


Neck: Present: supple, normal ROM





- Respiratory


Respiratory effort: normal


Respiratory: bilateral: diminished, rhonchi, negative: rales, wheezing





- Cardiovascular


Rhythm: regular


Heart Sounds: Present: S1 & S2





- Extremities


Extremities: no ischemia, No edema





- Abdominal


General gastrointestinal: soft, non-tender, non-distended, normal bowel sounds





- Integumentary


Integumentary: Present: clear, warm





- Psychiatric


Psychiatric: appropriate mood/affect, cooperative





- Neurologic


Neurologic: CNII-XII intact, moves all extremities





Results





- Labs


CBC & Chem 7: 


                                 06/18/19 03:58





                                 06/18/19 03:58


Labs: 


                             Laboratory Last Values











WBC  6.5 K/mm3 (4.5-11.0)   06/18/19  03:58    


 


RBC  4.68 M/mm3 (3.65-5.03)   06/18/19  03:58    


 


Hgb  14.8 gm/dl (11.8-15.2)   06/18/19  03:58    


 


Hct  45.7 % (35.5-45.6)  H  06/18/19  03:58    


 


MCV  98 fl (84-94)  H  06/18/19  03:58    


 


MCH  32 pg (28-32)   06/18/19  03:58    


 


MCHC  32 % (32-34)   06/18/19  03:58    


 


RDW  13.3 % (13.2-15.2)   06/18/19  03:58    


 


Plt Count  126 K/mm3 (140-440)  L  06/18/19  03:58    


 


Lymph % (Auto)  7.0 % (13.4-35.0)  L  06/18/19  03:58    


 


Mono % (Auto)  4.2 % (0.0-7.3)   06/18/19  03:58    


 


Eos % (Auto)  0.0 % (0.0-4.3)   06/18/19  03:58    


 


Baso % (Auto)  0.1 % (0.0-1.8)   06/18/19  03:58    


 


Lymph #  0.4 K/mm3 (1.2-5.4)  L  06/18/19  03:58    


 


Mono #  0.3 K/mm3 (0.0-0.8)   06/18/19  03:58    


 


Eos #  0.0 K/mm3 (0.0-0.4)   06/18/19  03:58    


 


Baso #  0.0 K/mm3 (0.0-0.1)   06/18/19  03:58    


 


Add Manual Diff  Complete   06/15/19  20:40    


 


Total Counted  100   06/15/19  20:40    


 


Seg Neutrophils %  88.7 % (40.0-70.0)  H  06/18/19  03:58    


 


Seg Neuts % (Manual)  34.0 % (40.0-70.0)  L  06/15/19  20:40    


 


  0 %  06/15/19  20:40    


 


  52.0 % (13.4-35.0)  H  06/15/19  20:40    


 


Reactive Lymphs % (Man)  0 %  06/15/19  20:40    


 


  13.0 % (0.0-7.3)  H  06/15/19  20:40    


 


  1.0 % (0.0-4.3)   06/15/19  20:40    


 


  0 % (0.0-1.8)   06/15/19  20:40    


 


  0 %  06/15/19  20:40    


 


  0 %  06/15/19  20:40    


 


  0 %  06/15/19  20:40    


 


  0 %  06/15/19  20:40    


 


Nucleated RBC %  Not Reportable   06/15/19  20:40    


 


Seg Neutrophils #  5.7 K/mm3 (1.8-7.7)   06/18/19  03:58    


 


Seg Neutrophils # Man  1.1 K/mm3 (1.8-7.7)  L  06/15/19  20:40    


 


Band Neutrophils #  0.0 K/mm3  06/15/19  20:40    


 


  1.7 K/mm3 (1.2-5.4)   06/15/19  20:40    


 


Abs React Lymphs (Man)  0.0 K/mm3  06/15/19  20:40    


 


  0.4 K/mm3 (0.0-0.8)   06/15/19  20:40    


 


  0.0 K/mm3 (0.0-0.4)   06/15/19  20:40    


 


  0.0 K/mm3 (0.0-0.1)   06/15/19  20:40    


 


  0.0 K/mm3  06/15/19  20:40    


 


  0.0 K/mm3  06/15/19  20:40    


 


  0.0 K/mm3  06/15/19  20:40    


 


Blast Cells #  0.0 K/mm3  06/15/19  20:40    


 


WBC Morphology  Not Reportable   06/15/19  20:40    


 


Hypersegmented Neuts  Not Reportable   06/15/19  20:40    


 


Hyposegmented Neuts  Not Reportable   06/15/19  20:40    


 


Hypogranular Neuts  Not Reportable   06/15/19  20:40    


 


  Not Reportable   06/15/19  20:40    


 


  Not Reportable   06/15/19  20:40    


 


  Not Reportable   06/15/19  20:40    


 


  Not Reportable   06/15/19  20:40    


 


  Not Reportable   06/15/19  20:40    


 


  Not Reportable   06/15/19  20:40    


 


  Appears decreased   06/15/19  20:40    


 


  Not Reportable   06/15/19  20:40    


 


Plt Clumps, EDTA  Not Reportable   06/15/19  20:40    


 


  Not Reportable   06/15/19  20:40    


 


  Not Reportable   06/15/19  20:40    


 


  Not Reportable   06/15/19  20:40    


 


Plt Morphology Comment  Not Reportable   06/15/19  20:40    


 


RBC Morphology  Not Reportable   06/15/19  20:40    


 


Dimorphic RBCs  Not Reportable   06/15/19  20:40    


 


  Not Reportable   06/15/19  20:40    


 


  Not Reportable   06/15/19  20:40    


 


  Not Reportable   06/15/19  20:40    


 


  Not Reportable   06/15/19  20:40    


 


  Not Reportable   06/15/19  20:40    


 


  Not Reportable   06/15/19  20:40    


 


  Not Reportable   06/15/19  20:40    


 


  Not Reportable   06/15/19  20:40    


 


  Not Reportable   06/15/19  20:40    


 


  Not Reportable   06/15/19  20:40    


 


  Not Reportable   06/15/19  20:40    


 


  Few   06/15/19  20:40    


 


  Not Reportable   06/15/19  20:40    


 


  Not Reportable   06/15/19  20:40    


 


  Not Reportable   06/15/19  20:40    


 


  Not Reportable   06/15/19  20:40    


 


  Not Reportable   06/15/19  20:40    


 


  Not Reportable   06/15/19  20:40    


 


  Not Reportable   06/15/19  20:40    


 


Acanthocytes (Spur)  Not Reportable   06/15/19  20:40    


 


Rouleaux  Not Reportable   06/15/19  20:40    


 


  Not Reportable   06/15/19  20:40    


 


  Not Reportable   06/15/19  20:40    


 


  Not Reportable   06/15/19  20:40    


 


  Not Reportable   06/15/19  20:40    


 


Hem Pathologist Commnt  No   06/15/19  20:40    


 


PT  14.8 Sec. (12.2-14.9)   06/15/19  21:10    


 


INR  1.19  (0.87-1.13)  H  06/15/19  21:10    


 


Sodium  139 mmol/L (137-145)   06/18/19  03:58    


 


Potassium  4.8 mmol/L (3.6-5.0)   06/18/19  03:58    


 


Chloride  95.7 mmol/L ()  L  06/18/19  03:58    


 


Carbon Dioxide  33 mmol/L (22-30)  H  06/18/19  03:58    


 


  15 mmol/L  06/18/19  03:58    


 


BUN  22 mg/dL (9-20)  H  06/18/19  03:58    


 


  0.7 mg/dL (0.8-1.5)  L  06/18/19  03:58    


 


Estimated GFR  > 60 ml/min  06/18/19  03:58    


 


  31 %  06/18/19  03:58    


 


Glucose  123 mg/dL ()  H  06/18/19  03:58    


 


Calcium  9.8 mg/dL (8.4-10.2)   06/18/19  03:58    


 


Magnesium  2.20 mg/dL (1.7-2.3)   06/15/19  21:10    


 


  103 units/L ()   06/15/19  21:10    














Active Medications





- Current Medications


Current Medications: 














Generic Name Dose Route Start Last Admin





  Trade Name Freq  PRN Reason Stop Dose Admin


 


Acetaminophen  650 mg  06/16/19 06:54 





  Tylenol  PO  





  Q6H PRN  





  Pain  


 


Albuterol  2.5 mg  06/16/19 07:21 





  Proventil  IH  





  Q4HRT PRN  





  Shortness Of Breath  


 


Albuterol/Ipratropium  1 ampul  06/18/19 08:00  06/18/19 08:59





  Duoneb *Not For Prn Use*  IH   Not Given





  TIDRT [facility]  


 


Enoxaparin Sodium  40 mg  06/17/19 22:00  06/17/19 21:35





  Lovenox  SUB-Q   40 mg





  QDAY@2200 KENDRA   Administration


 


Guaifenesin  600 mg  06/17/19 22:40  06/18/19 09:36





  Mucinex Er  PO   600 mg





  BID KENDRA   Administration


 


Levofloxacin/Dextrose  750 mg in 150 mls @ 100 mls/hr  06/17/19 17:00  06/17/19 

17:53





  Levaquin 750mg/150ml  IV   100 mls/hr





  Q24H KENDRA   Administration





  Protocol  


 


Ibuprofen  600 mg  06/16/19 06:54  06/17/19 21:35





  Ibuprofen  PO   600 mg





  Q8H PRN   Administration





  Pain  


 


Methylprednisolone Sodium Succinate  40 mg  06/17/19 20:00  06/18/19 09:38





  Solu-Medrol  IV   40 mg





  Q6H KENDRA   Administration


 


Ondansetron HCl  4 mg  06/15/19 22:33 





  Zofran  IV  





  Q8H PRN  





  Nausea And Vomiting  


 


Sodium Chloride  10 ml  06/15/19 23:00  06/18/19 09:36





  Sodium Chloride Flush Syringe 10 Ml  IV   10 ml





  BID KENDRA   Administration


 


Sodium Chloride  10 ml  06/15/19 22:33 





  Sodium Chloride Flush Syringe 10 Ml  IV  





  PRN PRN  





  LINE FLUSH  














Nutrition/Malnutrition Assess





- Dietary Evaluation


Nutrition/Malnutrition Findings: 


                                        





Nutrition Notes                                            Start:  06/17/19 

17:35


Freq:                                                      Status: Active       




Protocol:                                                                       




 Document     06/17/19 17:35  RM  (Rec: 06/17/19 17:44  RM  [id number])


 Nutrition Notes


     Need for Assessment generated from:         MD Order,MST


     Initial or Follow up                        Assessment


     Current Diagnosis                           COPD,Hypertension


     Current Diet                                Regular w/Ensure Enlive


                                                 Vanilla BID


     Labs/Tests                                  Reviewed


     Pertinent Medications                       Solu-Medrol


     Height                                      5 ft 3 in


     Weight                                      47.6 kg


     Usual Body Weight                           54.55 kg


     Ideal Body Weight (kg)                      56.36


     BMI                                         18.6


     Weight change and time frame                12.7% wt loss X 6 months


     Subjective/Other Information                Consulted for malnutrition and


                                                 ONS diet.


                                                 Pt stated that PTA he ate 3


                                                 meals daily. Stated that he


                                                 eats all of his meals here and


                                                 that he drinks the Ensure


                                                 Enlive. Noted 2 unopened


                                                 Ensure Enlive at bedside. Pt


                                                 stated that he has not gotten


                                                 around to drinking them but


                                                 will today. Stated UBW was 120


                                                 lbs 6 months ago.


                                                 No temporal or orbital wasting


                                                 .


     Percent of energy/protein needs met:        100%/100%


     Burn                                        Absent


     Trauma                                      Absent


     #2


      Nutrition Diagnosis                        Underweight


      Comments:                                  for older adult


      Etiology                                   COPD


      As Evidenced by Signs and Symptoms         BMI 18.6


     #1


      Nutrition Diagnosis                        Unintended weight loss


      Etiology                                   COPD


      As Evidenced by Signs and Symptoms         12.7% wt loss X 6 months


     Is patient on ventilator?                   No


     Is Patient Ambulatory and/or Out of Bed     Yes


     REE-([facility]-ambulatory/OOB) [     1496.469


      [id number]]                               


     Kcal/Kg value to use for calculation        38


     Approximate Energy Requirements Using       1809


      kcal/Kg                                    


     Calculation Used for Recommendations        Kcal/kg


     Additional Notes                            Protein Needs: 57-71g (1.2-1.


                                                 5g/kg)


                                                 Fluid Needs: 1 ml/kcal


 Nutrition Intervention


     Change Diet Order:                          Continue current


     Add Supplement/Snack (indicate name/kcal    Ensure Enlive Vanilla BID


      /protein )                                 


     Provides kCal:                              700


     Provides Protein (gm)                       40


     Goal #1                                     Continue to meet least 75% of


                                                 calorie and protein needs via


                                                 PO and ONS intakes


     Goal #2                                     Wt gain/maintenance


     Anticipated Discharge Needs:                Regular diet


     Follow-Up By:                               06/24/19


     Additional Comments                         Follow for PO and ONS intakes

## 2019-06-18 NOTE — PROGRESS NOTE
Assessment and Plan





Patient weak. Patient still complaining shortness of breath and cough. Patient 

is on 2L O2. O2 Sat 97%. 


ABG on room air 06/18/19


   pH 7.37


   PCO2 65.6


   PO2 50


   BiCO3 37.9 


   O2 Sat 82   








- Patient Problems


(1) COPD exacerbation


Current Visit: Yes   Status: Acute   


Plan to address problem: 


O2 2 litres via nasal canula.


 Albuterol/Atrovent aerosol treatments q 6 hours.


 Continue I/V solumedrol.


 Continue S/C Lovenox.


 Continue Levaquin.


 Recommend GI prophylaxis.


 








(2) Acute bronchitis


Current Visit: Yes   Status: Acute   


Plan to address problem: 


Patient is on Levaquin.








Subjective


Date of service: 06/18/19


Interval history: 





Patient weak. Patient still complaining shortness of breath and cough. Patient 

is on 2L O2. O2 Sat 97%. 


ABG on room air











POC ABG pH  7.370  (7.35-7.45)   06/18/19  11:26    


 


POC ABG pCO2  65.6  (35-45)  H  06/18/19  11:26    


 


POC ABG pO2  50  ()  L  06/18/19  11:26    


 


POC ABG HCO3  37.9  (22-26 mml/L)   06/18/19  11:26    


 


POC ABG Total CO2  40  (23-27mmol/L)   06/18/19  11:26    


 


POC ABG O2 Sat  82   06/18/19  11:26    














Objective


                               Vital Signs - 12hr











  06/18/19 06/18/19





  08:59 09:30


 


Temperature  98.2 F


 


Pulse Rate  75


 


Respiratory  16





Rate  


 


Blood Pressure  109/82


 


O2 Sat by Pulse 96 97





Oximetry  











Constitutional: alert, appears uncomfortable


Eyes: non-icteric


ENT: oropharynx moist


Neck: supple, no JVD


Ascultation: Bilateral: diminished breath sounds, other (Prolonged expiratory 

phase.)


Cardiovascular: regular rate and rhythm


Gastrointestinal: normoactive bowel sounds, soft, non-tender


Integumentary: normal


Extremities: no cyanosis, no edema


Neurologic: non-focal exam, pupils equal and round


Psychiatric: anxious


CBC and BMP: 


                                 06/18/19 03:58





                                 06/18/19 03:58


ABG, PT/INR, D-dimer: 


ABG











POC ABG pH  7.370  (7.35-7.45)   06/18/19  11:26    


 


POC ABG pCO2  65.6  (35-45)  H  06/18/19  11:26    


 


POC ABG pO2  50  ()  L  06/18/19  11:26    


 


POC ABG HCO3  37.9  (22-26 mml/L)   06/18/19  11:26    


 


POC ABG Total CO2  40  (23-27mmol/L)   06/18/19  11:26    


 


POC ABG O2 Sat  82   06/18/19  11:26    





PT/INR, D-dimer











PT  14.8 Sec. (12.2-14.9)   06/15/19  21:10    


 


INR  1.19  (0.87-1.13)  H  06/15/19  21:10    








Abnormal lab findings: 


                                  Abnormal Labs











  06/15/19 06/15/19 06/15/19





  20:40 20:40 21:10


 


WBC  3.2 L  


 


Hct   


 


MCV  95 H  


 


RDW  12.2 L  


 


Plt Count  108 L  


 


Lymph % (Auto)   


 


Lymph #   


 


Seg Neutrophils %   


 


Seg Neuts % (Manual)  34.0 L  


 


Lymphocytes % (Manual)  52.0 H  


 


Monocytes % (Manual)  13.0 H  


 


Seg Neutrophils # Man  1.1 L  


 


INR    1.19 H


 


POC ABG pCO2   


 


POC ABG pO2   


 


Sodium   135 L 


 


Chloride   93.0 L 


 


Carbon Dioxide   31 H 


 


BUN   


 


Creatinine   0.7 L 


 


Glucose   














  06/18/19 06/18/19 06/18/19





  03:58 03:58 11:26


 


WBC   


 


Hct  45.7 H  


 


MCV  98 H  


 


RDW   


 


Plt Count  126 L  


 


Lymph % (Auto)  7.0 L  


 


Lymph #  0.4 L  


 


Seg Neutrophils %  88.7 H  


 


Seg Neuts % (Manual)   


 


Lymphocytes % (Manual)   


 


Monocytes % (Manual)   


 


Seg Neutrophils # Man   


 


INR   


 


POC ABG pCO2    65.6 H


 


POC ABG pO2    50 L


 


Sodium   


 


Chloride   95.7 L 


 


Carbon Dioxide   33 H 


 


BUN   22 H 


 


Creatinine   0.7 L 


 


Glucose   123 H

## 2019-06-19 RX ADMIN — Medication PRN ML: at 08:36

## 2019-06-19 RX ADMIN — GUAIFENESIN SCH MG: 600 TABLET ORAL at 10:01

## 2019-06-19 RX ADMIN — Medication PRN ML: at 14:38

## 2019-06-19 RX ADMIN — IPRATROPIUM BROMIDE AND ALBUTEROL SULFATE SCH AMPUL: .5; 3 SOLUTION RESPIRATORY (INHALATION) at 13:15

## 2019-06-19 RX ADMIN — IPRATROPIUM BROMIDE AND ALBUTEROL SULFATE SCH AMPUL: .5; 3 SOLUTION RESPIRATORY (INHALATION) at 07:17

## 2019-06-19 RX ADMIN — Medication SCH ML: at 21:38

## 2019-06-19 RX ADMIN — LEVOFLOXACIN SCH MLS/HR: 5 INJECTION, SOLUTION INTRAVENOUS at 16:49

## 2019-06-19 RX ADMIN — Medication PRN ML: at 16:49

## 2019-06-19 RX ADMIN — Medication SCH ML: at 10:01

## 2019-06-19 RX ADMIN — IPRATROPIUM BROMIDE AND ALBUTEROL SULFATE SCH AMPUL: .5; 3 SOLUTION RESPIRATORY (INHALATION) at 20:19

## 2019-06-19 RX ADMIN — GUAIFENESIN SCH MG: 600 TABLET ORAL at 21:37

## 2019-06-19 RX ADMIN — ENOXAPARIN SODIUM SCH MG: 100 INJECTION SUBCUTANEOUS at 21:37

## 2019-06-19 NOTE — PROGRESS NOTE
Assessment and Plan





Patient awake. Sitting up in naty. Says breathing better. Patient is on 2L O2. 

O2 Sat 97%. 


ABG on room air











POC ABG pH  7.370  (7.35-7.45)   06/18/19  11:26    


 


POC ABG pCO2  65.6  (35-45)  H  06/18/19  11:26    


 


POC ABG pO2  50  ()  L  06/18/19  11:26    


 


POC ABG HCO3  37.9  (22-26 mml/L)   06/18/19  11:26    


 


POC ABG Total CO2  40  (23-27mmol/L)   06/18/19  11:26    


 


POC ABG O2 Sat  82   06/18/19  11:26    








Patient already has home O2








- Patient Problems


(1) COPD exacerbation


Current Visit: Yes   Status: Acute   


Plan to address problem: 


O2 2 litres via nasal canula.


 Albuterol/Atrovent aerosol treatments q 6 hours.


 Continue I/V solumedrol.


 Continue S/C Lovenox.


 Continue Levaquin.


 Recommend GI prophylaxis.


 








(2) Acute bronchitis


Current Visit: Yes   Status: Acute   


Plan to address problem: 


Patient is on Levaquin.








Subjective


Date of service: 06/19/19


Interval history: 





Patient awake. Sitting up in naty. Says breathing better. Patient is on 2L O2. 

O2 Sat 97%. 


ABG on room air











POC ABG pH  7.370  (7.35-7.45)   06/18/19  11:26    


 


POC ABG pCO2  65.6  (35-45)  H  06/18/19  11:26    


 


POC ABG pO2  50  ()  L  06/18/19  11:26    


 


POC ABG HCO3  37.9  (22-26 mml/L)   06/18/19  11:26    


 


POC ABG Total CO2  40  (23-27mmol/L)   06/18/19  11:26    


 


POC ABG O2 Sat  82   06/18/19  11:26    








Patient already has home O2.





Objective


                               Vital Signs - 12hr











  06/19/19 06/19/19 06/19/19





  07:19 07:20 07:25


 


Temperature   


 


Pulse Rate   


 


Pulse Rate [ 90  92 H





Anterior   





Bilateral   





Throughout]   


 


Pulse Rate [   





From Monitor]   


 


Respiratory   





Rate   


 


Respiratory 18  18





Rate [Anterior   





Bilateral   





Throughout]   


 


Blood Pressure   


 


O2 Sat by Pulse  96 





Oximetry   














  06/19/19 06/19/19 06/19/19





  07:41 10:00 13:15


 


Temperature 97.9 F  


 


Pulse Rate 51 L  


 


Pulse Rate [   88





Anterior   





Bilateral   





Throughout]   


 


Pulse Rate [  51 L 





From Monitor]   


 


Respiratory 18 18 





Rate   


 


Respiratory   18





Rate [Anterior   





Bilateral   





Throughout]   


 


Blood Pressure 115/79  


 


O2 Sat by Pulse 96 96 





Oximetry   














  06/19/19 06/19/19





  13:20 13:38


 


Temperature  98.2 F


 


Pulse Rate  56 L


 


Pulse Rate [ 85 





Anterior  





Bilateral  





Throughout]  


 


Pulse Rate [  





From Monitor]  


 


Respiratory  18





Rate  


 


Respiratory 18 





Rate [Anterior  





Bilateral  





Throughout]  


 


Blood Pressure  120/76


 


O2 Sat by Pulse  97





Oximetry  











Constitutional: no acute distress, alert


Eyes: non-icteric


ENT: oropharynx moist


Neck: supple, no JVD


Ascultation: Bilateral: diminished breath sounds, other (Prolonged expiratory 

phase.)


Cardiovascular: regular rate and rhythm


Gastrointestinal: normoactive bowel sounds, soft, non-tender


Integumentary: normal


Extremities: no cyanosis, no edema


Neurologic: non-focal exam, pupils equal and round


Psychiatric: anxious


CBC and BMP: 


                                 06/18/19 03:58





                                 06/18/19 03:58


ABG, PT/INR, D-dimer: 


ABG











POC ABG pH  7.370  (7.35-7.45)   06/18/19  11:26    


 


POC ABG pCO2  65.6  (35-45)  H  06/18/19  11:26    


 


POC ABG pO2  50  ()  L  06/18/19  11:26    


 


POC ABG HCO3  37.9  (22-26 mml/L)   06/18/19  11:26    


 


POC ABG Total CO2  40  (23-27mmol/L)   06/18/19  11:26    


 


POC ABG O2 Sat  82   06/18/19  11:26    





PT/INR, D-dimer











PT  14.8 Sec. (12.2-14.9)   06/15/19  21:10    


 


INR  1.19  (0.87-1.13)  H  06/15/19  21:10    








Abnormal lab findings: 


                                  Abnormal Labs











  06/15/19 06/15/19 06/15/19





  20:40 20:40 21:10


 


WBC  3.2 L  


 


Hct   


 


MCV  95 H  


 


RDW  12.2 L  


 


Plt Count  108 L  


 


Lymph % (Auto)   


 


Lymph #   


 


Seg Neutrophils %   


 


Seg Neuts % (Manual)  34.0 L  


 


Lymphocytes % (Manual)  52.0 H  


 


Monocytes % (Manual)  13.0 H  


 


Seg Neutrophils # Man  1.1 L  


 


INR    1.19 H


 


POC ABG pCO2   


 


POC ABG pO2   


 


Sodium   135 L 


 


Chloride   93.0 L 


 


Carbon Dioxide   31 H 


 


BUN   


 


Creatinine   0.7 L 


 


Glucose   














  06/18/19 06/18/19 06/18/19





  03:58 03:58 11:26


 


WBC   


 


Hct  45.7 H  


 


MCV  98 H  


 


RDW   


 


Plt Count  126 L  


 


Lymph % (Auto)  7.0 L  


 


Lymph #  0.4 L  


 


Seg Neutrophils %  88.7 H  


 


Seg Neuts % (Manual)   


 


Lymphocytes % (Manual)   


 


Monocytes % (Manual)   


 


Seg Neutrophils # Man   


 


INR   


 


POC ABG pCO2    65.6 H


 


POC ABG pO2    50 L


 


Sodium   


 


Chloride   95.7 L 


 


Carbon Dioxide   33 H 


 


BUN   22 H 


 


Creatinine   0.7 L 


 


Glucose   123 H

## 2019-06-19 NOTE — PROGRESS NOTE
Assessment and Plan


Assessment and plan: 


--Acute bronchitis; bronchodilators, oxygen


Antibiotics and supportive care, cough medicine





--COPD with acute exacerbation:


Oxygen titrate O2 sats to more than 90%, nebulizers


Tapering dose of IV steroids, IV antibiotics, cough medicine


Inhalation steroids, admitted following


Evaluation for home oxygen at discharge





--Acute hypoxic respiratory failure


Secondary to COPD exacerbation


Improvement of symptoms, continue current management





--HTN ; well controlled.  Continue current antihypertensives 


and when necessary medications 





--Ongoing tobacco use ; smoking cessation nicotine patch as needed





--Thrombocytopenia; monitor levels





--Ambulate as tolerated


Possible discharge with home health versus SNF placement .





Plan of care is reviewed with the patient and his nurse

















History


Interval history: 


Patient seen while receiving physical therapy


Complaints of generalized weakness


Productive A, not in acute distress


Cachectic, malnourished


Vital signs noted





Hospitalist Physical





- Constitutional


Vitals: 


                                        











Temp Pulse Resp BP Pulse Ox


 


 97.9 F   51 L  18   115/79   96 


 


 06/19/19 07:41  06/19/19 07:41  06/19/19 07:41  06/19/19 07:41  06/19/19 07:41











General appearance: Present: no acute distress, mild distress, cachectic





- EENT


Eyes: Present: PERRL, EOM intact





- Neck


Neck: Present: supple, normal ROM





- Respiratory


Respiratory effort: normal


Respiratory: bilateral: diminished, wheezing, negative: rales, rhonchi





- Cardiovascular


Rhythm: regular


Heart Sounds: Present: S1 & S2





- Extremities


Extremities: no ischemia, No edema





- Abdominal


General gastrointestinal: soft, non-tender, non-distended, normal bowel sounds





- Integumentary


Integumentary: Present: clear, warm





- Psychiatric


Psychiatric: appropriate mood/affect, cooperative





- Neurologic


Neurologic: CNII-XII intact, moves all extremities





Results





- Labs


CBC & Chem 7: 


                                 06/18/19 03:58





                                 06/18/19 03:58


Labs: 


                             Laboratory Last Values











WBC  6.5 K/mm3 (4.5-11.0)   06/18/19  03:58    


 


RBC  4.68 M/mm3 (3.65-5.03)   06/18/19  03:58    


 


Hgb  14.8 gm/dl (11.8-15.2)   06/18/19  03:58    


 


Hct  45.7 % (35.5-45.6)  H  06/18/19  03:58    


 


MCV  98 fl (84-94)  H  06/18/19  03:58    


 


MCH  32 pg (28-32)   06/18/19  03:58    


 


MCHC  32 % (32-34)   06/18/19  03:58    


 


RDW  13.3 % (13.2-15.2)   06/18/19  03:58    


 


Plt Count  126 K/mm3 (140-440)  L  06/18/19  03:58    


 


Lymph % (Auto)  7.0 % (13.4-35.0)  L  06/18/19  03:58    


 


Mono % (Auto)  4.2 % (0.0-7.3)   06/18/19  03:58    


 


Eos % (Auto)  0.0 % (0.0-4.3)   06/18/19  03:58    


 


Baso % (Auto)  0.1 % (0.0-1.8)   06/18/19  03:58    


 


Lymph #  0.4 K/mm3 (1.2-5.4)  L  06/18/19  03:58    


 


Mono #  0.3 K/mm3 (0.0-0.8)   06/18/19  03:58    


 


Eos #  0.0 K/mm3 (0.0-0.4)   06/18/19  03:58    


 


Baso #  0.0 K/mm3 (0.0-0.1)   06/18/19  03:58    


 


Add Manual Diff  Complete   06/15/19  20:40    


 


Total Counted  100   06/15/19  20:40    


 


Seg Neutrophils %  88.7 % (40.0-70.0)  H  06/18/19  03:58    


 


Seg Neuts % (Manual)  34.0 % (40.0-70.0)  L  06/15/19  20:40    


 


  0 %  06/15/19  20:40    


 


  52.0 % (13.4-35.0)  H  06/15/19  20:40    


 


Reactive Lymphs % (Man)  0 %  06/15/19  20:40    


 


  13.0 % (0.0-7.3)  H  06/15/19  20:40    


 


  1.0 % (0.0-4.3)   06/15/19  20:40    


 


  0 % (0.0-1.8)   06/15/19  20:40    


 


  0 %  06/15/19  20:40    


 


  0 %  06/15/19  20:40    


 


  0 %  06/15/19  20:40    


 


  0 %  06/15/19  20:40    


 


Nucleated RBC %  Not Reportable   06/15/19  20:40    


 


Seg Neutrophils #  5.7 K/mm3 (1.8-7.7)   06/18/19  03:58    


 


Seg Neutrophils # Man  1.1 K/mm3 (1.8-7.7)  L  06/15/19  20:40    


 


Band Neutrophils #  0.0 K/mm3  06/15/19  20:40    


 


  1.7 K/mm3 (1.2-5.4)   06/15/19  20:40    


 


Abs React Lymphs (Man)  0.0 K/mm3  06/15/19  20:40    


 


  0.4 K/mm3 (0.0-0.8)   06/15/19  20:40    


 


  0.0 K/mm3 (0.0-0.4)   06/15/19  20:40    


 


  0.0 K/mm3 (0.0-0.1)   06/15/19  20:40    


 


  0.0 K/mm3  06/15/19  20:40    


 


  0.0 K/mm3  06/15/19  20:40    


 


  0.0 K/mm3  06/15/19  20:40    


 


Blast Cells #  0.0 K/mm3  06/15/19  20:40    


 


WBC Morphology  Not Reportable   06/15/19  20:40    


 


Hypersegmented Neuts  Not Reportable   06/15/19  20:40    


 


Hyposegmented Neuts  Not Reportable   06/15/19  20:40    


 


Hypogranular Neuts  Not Reportable   06/15/19  20:40    


 


  Not Reportable   06/15/19  20:40    


 


  Not Reportable   06/15/19  20:40    


 


  Not Reportable   06/15/19  20:40    


 


  Not Reportable   06/15/19  20:40    


 


  Not Reportable   06/15/19  20:40    


 


  Not Reportable   06/15/19  20:40    


 


  Appears decreased   06/15/19  20:40    


 


  Not Reportable   06/15/19  20:40    


 


Plt Clumps, EDTA  Not Reportable   06/15/19  20:40    


 


  Not Reportable   06/15/19  20:40    


 


  Not Reportable   06/15/19  20:40    


 


  Not Reportable   06/15/19  20:40    


 


Plt Morphology Comment  Not Reportable   06/15/19  20:40    


 


RBC Morphology  Not Reportable   06/15/19  20:40    


 


Dimorphic RBCs  Not Reportable   06/15/19  20:40    


 


  Not Reportable   06/15/19  20:40    


 


  Not Reportable   06/15/19  20:40    


 


  Not Reportable   06/15/19  20:40    


 


  Not Reportable   06/15/19  20:40    


 


  Not Reportable   06/15/19  20:40    


 


  Not Reportable   06/15/19  20:40    


 


  Not Reportable   06/15/19  20:40    


 


  Not Reportable   06/15/19  20:40    


 


  Not Reportable   06/15/19  20:40    


 


  Not Reportable   06/15/19  20:40    


 


  Not Reportable   06/15/19  20:40    


 


  Few   06/15/19  20:40    


 


  Not Reportable   06/15/19  20:40    


 


  Not Reportable   06/15/19  20:40    


 


  Not Reportable   06/15/19  20:40    


 


  Not Reportable   06/15/19  20:40    


 


  Not Reportable   06/15/19  20:40    


 


  Not Reportable   06/15/19  20:40    


 


  Not Reportable   06/15/19  20:40    


 


Acanthocytes (Spur)  Not Reportable   06/15/19  20:40    


 


Rouleaux  Not Reportable   06/15/19  20:40    


 


  Not Reportable   06/15/19  20:40    


 


  Not Reportable   06/15/19  20:40    


 


  Not Reportable   06/15/19  20:40    


 


  Not Reportable   06/15/19  20:40    


 


Hem Pathologist Commnt  No   06/15/19  20:40    


 


PT  14.8 Sec. (12.2-14.9)   06/15/19  21:10    


 


INR  1.19  (0.87-1.13)  H  06/15/19  21:10    


 


POC ABG pH  7.370  (7.35-7.45)   06/18/19  11:26    


 


POC ABG pCO2  65.6  (35-45)  H  06/18/19  11:26    


 


POC ABG pO2  50  ()  L  06/18/19  11:26    


 


POC ABG HCO3  37.9  (22-26 mml/L)   06/18/19  11:26    


 


POC ABG Total CO2  40  (23-27mmol/L)   06/18/19  11:26    


 


POC ABG O2 Sat  82   06/18/19  11:26    


 


POC ABG Base Excess  13  ((-2) - (+3)mmol/L)   06/18/19  11:26    


 


  21 %  06/18/19  11:26    


 


Sodium  139 mmol/L (137-145)   06/18/19  03:58    


 


Potassium  4.8 mmol/L (3.6-5.0)   06/18/19  03:58    


 


Chloride  95.7 mmol/L ()  L  06/18/19  03:58    


 


Carbon Dioxide  33 mmol/L (22-30)  H  06/18/19  03:58    


 


  15 mmol/L  06/18/19  03:58    


 


BUN  22 mg/dL (9-20)  H  06/18/19  03:58    


 


  0.7 mg/dL (0.8-1.5)  L  06/18/19  03:58    


 


Estimated GFR  > 60 ml/min  06/18/19  03:58    


 


  31 %  06/18/19  03:58    


 


Glucose  123 mg/dL ()  H  06/18/19  03:58    


 


Calcium  9.8 mg/dL (8.4-10.2)   06/18/19  03:58    


 


Magnesium  2.20 mg/dL (1.7-2.3)   06/15/19  21:10    


 


  103 units/L ()   06/15/19  21:10    














Active Medications





- Current Medications


Current Medications: 














Generic Name Dose Route Start Last Admin





  Trade Name Freq  PRN Reason Stop Dose Admin


 


Acetaminophen  650 mg  06/16/19 06:54 





  Tylenol  PO  





  Q6H PRN  





  Pain  


 


Albuterol  2.5 mg  06/16/19 07:21 





  Proventil  IH  





  Q4HRT PRN  





  Shortness Of Breath  


 


Albuterol/Ipratropium  1 ampul  06/18/19 08:00  06/19/19 07:17





  Duoneb *Not For Prn Use*  IH   1 ampul





  TIDRT KENDRA   Administration


 


Enoxaparin Sodium  40 mg  06/17/19 22:00  06/18/19 22:22





  Lovenox  SUB-Q   40 mg





  QDAY@2200 KENDRA   Administration


 


Guaifenesin  600 mg  06/17/19 22:40  06/19/19 10:01





  Mucinex Er  PO   600 mg





  BID KENDRA   Administration


 


Levofloxacin/Dextrose  750 mg in 150 mls @ 100 mls/hr  06/17/19 17:00  06/18/19 

16:58





  Levaquin 750mg/150ml  IV   100 mls/hr





  Q24H KENDRA   Administration





  Protocol  


 


Ibuprofen  600 mg  06/16/19 06:54  06/17/19 21:35





  Ibuprofen  PO   600 mg





  Q8H PRN   Administration





  Pain  


 


Methylprednisolone Sodium Succinate  40 mg  06/17/19 20:00  06/19/19 08:35





  Solu-Medrol  IV   40 mg





  Q6H KENDRA   Administration


 


Ondansetron HCl  4 mg  06/15/19 22:33 





  Zofran  IV  





  Q8H PRN  





  Nausea And Vomiting  


 


Sodium Chloride  10 ml  06/15/19 23:00  06/19/19 10:01





  Sodium Chloride Flush Syringe 10 Ml  IV   10 ml





  BID KENDRA   Administration


 


Sodium Chloride  10 ml  06/15/19 22:33  06/19/19 08:36





  Sodium Chloride Flush Syringe 10 Ml  IV   10 ml





  PRN PRN   Administration





  LINE FLUSH  














Nutrition/Malnutrition Assess





- Dietary Evaluation


Nutrition/Malnutrition Findings: 


                                        





Nutrition Notes                                            Start:  06/17/19 

17:35


Freq:                                                      Status: Active       




Protocol:                                                                       




 Document     06/17/19 17:35  RM  (Rec: 06/17/19 17:44  RM  [id number])


 Nutrition Notes


     Need for Assessment generated from:         MD Order,MST


     Initial or Follow up                        Assessment


     Current Diagnosis                           COPD,Hypertension


     Current Diet                                Regular w/Ensure Enlive


                                                 Vanilla BID


     Labs/Tests                                  Reviewed


     Pertinent Medications                       Solu-Medrol


     Height                                      5 ft 3 in


     Weight                                      47.6 kg


     Usual Body Weight                           54.55 kg


     Ideal Body Weight (kg)                      56.36


     BMI                                         18.6


     Weight change and time frame                12.7% wt loss X 6 months


     Subjective/Other Information                Consulted for malnutrition and


                                                 ONS diet.


                                                 Pt stated that PTA he ate 3


                                                 meals daily. Stated that he


                                                 eats all of his meals here and


                                                 that he drinks the Ensure


                                                 Enlive. Noted 2 unopened


                                                 Ensure Enlive at bedside. Pt


                                                 stated that he has not gotten


                                                 around to drinking them but


                                                 will today. Stated UBW was 120


                                                 lbs 6 months ago.


                                                 No temporal or orbital wasting


                                                 .


     Percent of energy/protein needs met:        100%/100%


     Burn                                        Absent


     Trauma                                      Absent


     #2


      Nutrition Diagnosis                        Underweight


      Comments:                                  for older adult


      Etiology                                   COPD


      As Evidenced by Signs and Symptoms         BMI 18.6


     #1


      Nutrition Diagnosis                        Unintended weight loss


      Etiology                                   COPD


      As Evidenced by Signs and Symptoms         12.7% wt loss X 6 months


     Is patient on ventilator?                   No


     Is Patient Ambulatory and/or Out of Bed     Yes


     REE-(Caroline-St. Jeor-ambulatory/OOB) [     1496.469


      [email or web address]]                               


     Kcal/Kg value to use for calculation        38


     Approximate Energy Requirements Using       1809


      kcal/Kg                                    


     Calculation Used for Recommendations        Kcal/kg


     Additional Notes                            Protein Needs: 57-71g (1.2-1.


                                                 5g/kg)


                                                 Fluid Needs: 1 ml/kcal


 Nutrition Intervention


     Change Diet Order:                          Continue current


     Add Supplement/Snack (indicate name/kcal    Ensure Enlive Vanilla BID


      /protein )                                 


     Provides kCal:                              700


     Provides Protein (gm)                       40


     Goal #1                                     Continue to meet least 75% of


                                                 calorie and protein needs via


                                                 PO and ONS intakes


     Goal #2                                     Wt gain/maintenance


     Anticipated Discharge Needs:                Regular diet


     Follow-Up By:                               06/24/19


     Additional Comments                         Follow for PO and ONS intakes

## 2019-06-20 VITALS — DIASTOLIC BLOOD PRESSURE: 78 MMHG | SYSTOLIC BLOOD PRESSURE: 109 MMHG

## 2019-06-20 RX ADMIN — IPRATROPIUM BROMIDE AND ALBUTEROL SULFATE SCH: .5; 3 SOLUTION RESPIRATORY (INHALATION) at 07:42

## 2019-06-20 RX ADMIN — IPRATROPIUM BROMIDE AND ALBUTEROL SULFATE SCH AMPUL: .5; 3 SOLUTION RESPIRATORY (INHALATION) at 10:05

## 2019-06-20 RX ADMIN — Medication SCH ML: at 09:22

## 2019-06-20 RX ADMIN — IPRATROPIUM BROMIDE AND ALBUTEROL SULFATE SCH AMPUL: .5; 3 SOLUTION RESPIRATORY (INHALATION) at 15:43

## 2019-06-20 RX ADMIN — GUAIFENESIN SCH MG: 600 TABLET ORAL at 09:21

## 2019-06-20 NOTE — DISCHARGE SUMMARY
Providers





- Providers


Date of Admission: 


06/15/19 22:33





Date of discharge: 06/20/19


Attending physician: 


AMY J KOCHERLA





                                        





06/15/19 23:41


Consult to Dietitian/Nutrition [CONS] Routine 


   Physician Instructions: 


   Reason For Exam: 


   Reason for Consult: Malnutrition





06/17/19 15:31


Consult to Physician [CONS] Routine 


   Comment: 


   Consulting Provider: LULA TEJEDA


   Physician Instructions: 


   Reason For Exam: copd exacerbation





06/18/19 12:39


Physical Therapy Evaluation and Treat [CONS] Routine 


   Comment: 


   Reason For Exam: weakness/sob with activity





06/18/19 12:40


Occupational Therapy Evaluate and Treat [CONS] Routine 


   Comment: 


   Reason For Exam: weakness/sob with activity











Primary care physician: 


[facility], MD








Hospitalization


Reason for admission: worsening shortness of breath/acute on chronic respiratory

 failure


Condition: Fair


Pertinent studies: 


Chest x-ray findings consistent with COPD;





Hospital course: 


Patient is a 68-year-old male with PMHx of COPD (home O2 dependent), 

hypertension, tobacco use disorder who presents to the ER with complaints of 

productive cough, wheezing, shortness of breath 2 days.  Patient states that he

 had been having trouble breathing at home, he took his nebulizer treatment 

without relief of his symptoms, patient also reports that the symptoms are 

associated with shortness of breath, cough.  Patient states that he had been  

coughing for 2 days with whitish sputum production, he complains of increase 

shortness of breath with activities, chest congestion that has been going on for

 a week.  Patient managed with oxygen nebulizers IV antibiotics tapering dose of

 IV steroids


See physical therapy, who recommended SNF/rehabilitation placement


Case management has set up placement and transferred upon discharge


Today patient is comfortable pneumonia complaints


Signs stable physical examination unremarkable


Stable at discharge





Discharge diagnosis;


--Acute bronchitis; bronchodilators, oxygen


Antibiotics and supportive care, cough medicine





--COPD with acute exacerbation:


Oxygen titrate O2 sats to more than 90%, nebulizers


Tapering dose of IV steroids, IV antibiotics, cough medicine


Inhalation steroids, admitted following


Evaluation for home oxygen at discharge





--Acute hypoxic respiratory failure


Secondary to COPD exacerbation


Improvement of symptoms, continue current management





--HTN ; well controlled.  Continue current antihypertensives 


and when necessary medications 





--Ongoing tobacco use ; smoking cessation nicotine patch as needed





--Thrombocytopenia; monitor levels





--Ambulate as tolerated





Stable to be discharged and transferred to SNF today








Disposition: DC/TX-03 SNF [facility]


Time spent for discharge: 32 min





Core Measure Documentation





- Palliative Care


Palliative Care/ Comfort Measures: Not Applicable





- Core Measures


Any of the following diagnoses?: none





Exam





- Constitutional


Vitals: 


                                        











Temp Pulse Resp BP Pulse Ox


 


 98.6 F   99 H  20   115/89   98 


 


 06/20/19 08:02  06/20/19 10:13  06/20/19 10:13  06/20/19 08:02  06/20/19 08:02











General appearance: Present: no acute distress, well-nourished





- EENT


Eyes: Present: PERRL, EOM intact





- Neck


Neck: Present: supple, normal ROM





- Respiratory


Respiratory effort: normal


Respiratory: bilateral: CTA, diminished, wheezing





- Cardiovascular


Rhythm: regular


Heart Sounds: Present: S1 & S2





- Extremities


Extremities: no ischemia, No edema





- Abdominal


General gastrointestinal: Present: soft, non-tender, non-distended, hypoactive 

bowel sounds





- Integumentary


Integumentary: Present: clear, warm





- Musculoskeletal


Musculoskeletal: strength equal bilaterally





- Psychiatric


Psychiatric: cooperative





- Neurologic


Neurologic: CNII-XII intact





Plan


Activity: advance as tolerated, fall precautions


Diet: regular, low fat


Additional Instructions: Continue oxygen 2-3 L nasal cannula as needed


Follow up with: 


[facility] MD [Primary Care Provider] - 3-5 Days


LULA TEJEDA MD [Staff Physician] - 7 Days


Prescriptions: 


levoFLOXacin [Levaquin TAB] 750 mg PO DAILY #4 tablet


Prednisone [predniSONE 10 mg (6-Day Pack, 21 Tabs)] 10 mg PO .TAPER #1 tab.ds.pk

## 2019-06-20 NOTE — PROGRESS NOTE
Assessment and Plan


Assessment and plan: 


--Acute bronchitis; bronchodilators, oxygen


Antibiotics and supportive care, cough medicine





--COPD with acute exacerbation:


Oxygen titrate O2 sats to more than 90%, nebulizers


Tapering dose of IV steroids, IV antibiotics, cough medicine


Inhalation steroids, admitted following


Evaluation for home oxygen at discharge





--Acute hypoxic respiratory failure


Secondary to COPD exacerbation


Improvement of symptoms, continue current management





--HTN ; well controlled.  Continue current antihypertensives 


and when necessary medications 





--Ongoing tobacco use ; smoking cessation nicotine patch as needed





--Thrombocytopenia; monitor levels





--Ambulate as tolerated





Awaiting SNF placement/pending insurance approval








Hospitalist Physical





- Constitutional


Vitals: 


                                        











Temp Pulse Resp BP Pulse Ox


 


 98.2 F   55 L  18   107/80   96 


 


 06/20/19 02:27  06/20/19 02:27  06/20/19 02:27  06/20/19 02:27  06/20/19 02:27











General appearance: Present: no acute distress, mild distress, cachectic





Results





- Labs


CBC & Chem 7: 


                                 06/18/19 03:58





                                 06/18/19 03:58


Labs: 


                             Laboratory Last Values











WBC  6.5 K/mm3 (4.5-11.0)   06/18/19  03:58    


 


RBC  4.68 M/mm3 (3.65-5.03)   06/18/19  03:58    


 


Hgb  14.8 gm/dl (11.8-15.2)   06/18/19  03:58    


 


Hct  45.7 % (35.5-45.6)  H  06/18/19  03:58    


 


MCV  98 fl (84-94)  H  06/18/19  03:58    


 


MCH  32 pg (28-32)   06/18/19  03:58    


 


MCHC  32 % (32-34)   06/18/19  03:58    


 


RDW  13.3 % (13.2-15.2)   06/18/19  03:58    


 


Plt Count  126 K/mm3 (140-440)  L  06/18/19  03:58    


 


Lymph % (Auto)  7.0 % (13.4-35.0)  L  06/18/19  03:58    


 


Mono % (Auto)  4.2 % (0.0-7.3)   06/18/19  03:58    


 


Eos % (Auto)  0.0 % (0.0-4.3)   06/18/19  03:58    


 


Baso % (Auto)  0.1 % (0.0-1.8)   06/18/19  03:58    


 


Lymph #  0.4 K/mm3 (1.2-5.4)  L  06/18/19  03:58    


 


Mono #  0.3 K/mm3 (0.0-0.8)   06/18/19  03:58    


 


Eos #  0.0 K/mm3 (0.0-0.4)   06/18/19  03:58    


 


Baso #  0.0 K/mm3 (0.0-0.1)   06/18/19  03:58    


 


Add Manual Diff  Complete   06/15/19  20:40    


 


Total Counted  100   06/15/19  20:40    


 


Seg Neutrophils %  88.7 % (40.0-70.0)  H  06/18/19  03:58    


 


Seg Neuts % (Manual)  34.0 % (40.0-70.0)  L  06/15/19  20:40    


 


  0 %  06/15/19  20:40    


 


  52.0 % (13.4-35.0)  H  06/15/19  20:40    


 


Reactive Lymphs % (Man)  0 %  06/15/19  20:40    


 


  13.0 % (0.0-7.3)  H  06/15/19  20:40    


 


  1.0 % (0.0-4.3)   06/15/19  20:40    


 


  0 % (0.0-1.8)   06/15/19  20:40    


 


  0 %  06/15/19  20:40    


 


  0 %  06/15/19  20:40    


 


  0 %  06/15/19  20:40    


 


  0 %  06/15/19  20:40    


 


Nucleated RBC %  Not Reportable   06/15/19  20:40    


 


Seg Neutrophils #  5.7 K/mm3 (1.8-7.7)   06/18/19  03:58    


 


Seg Neutrophils # Man  1.1 K/mm3 (1.8-7.7)  L  06/15/19  20:40    


 


Band Neutrophils #  0.0 K/mm3  06/15/19  20:40    


 


  1.7 K/mm3 (1.2-5.4)   06/15/19  20:40    


 


Abs React Lymphs (Man)  0.0 K/mm3  06/15/19  20:40    


 


  0.4 K/mm3 (0.0-0.8)   06/15/19  20:40    


 


  0.0 K/mm3 (0.0-0.4)   06/15/19  20:40    


 


  0.0 K/mm3 (0.0-0.1)   06/15/19  20:40    


 


  0.0 K/mm3  06/15/19  20:40    


 


  0.0 K/mm3  06/15/19  20:40    


 


  0.0 K/mm3  06/15/19  20:40    


 


Blast Cells #  0.0 K/mm3  06/15/19  20:40    


 


WBC Morphology  Not Reportable   06/15/19  20:40    


 


Hypersegmented Neuts  Not Reportable   06/15/19  20:40    


 


Hyposegmented Neuts  Not Reportable   06/15/19  20:40    


 


Hypogranular Neuts  Not Reportable   06/15/19  20:40    


 


  Not Reportable   06/15/19  20:40    


 


  Not Reportable   06/15/19  20:40    


 


  Not Reportable   06/15/19  20:40    


 


  Not Reportable   06/15/19  20:40    


 


  Not Reportable   06/15/19  20:40    


 


  Not Reportable   06/15/19  20:40    


 


  Appears decreased   06/15/19  20:40    


 


  Not Reportable   06/15/19  20:40    


 


Plt Clumps, EDTA  Not Reportable   06/15/19  20:40    


 


  Not Reportable   06/15/19  20:40    


 


  Not Reportable   06/15/19  20:40    


 


  Not Reportable   06/15/19  20:40    


 


Plt Morphology Comment  Not Reportable   06/15/19  20:40    


 


RBC Morphology  Not Reportable   06/15/19  20:40    


 


Dimorphic RBCs  Not Reportable   06/15/19  20:40    


 


  Not Reportable   06/15/19  20:40    


 


  Not Reportable   06/15/19  20:40    


 


  Not Reportable   06/15/19  20:40    


 


  Not Reportable   06/15/19  20:40    


 


  Not Reportable   06/15/19  20:40    


 


  Not Reportable   06/15/19  20:40    


 


  Not Reportable   06/15/19  20:40    


 


  Not Reportable   06/15/19  20:40    


 


  Not Reportable   06/15/19  20:40    


 


  Not Reportable   06/15/19  20:40    


 


  Not Reportable   06/15/19  20:40    


 


  Few   06/15/19  20:40    


 


  Not Reportable   06/15/19  20:40    


 


  Not Reportable   06/15/19  20:40    


 


  Not Reportable   06/15/19  20:40    


 


  Not Reportable   06/15/19  20:40    


 


  Not Reportable   06/15/19  20:40    


 


  Not Reportable   06/15/19  20:40    


 


  Not Reportable   06/15/19  20:40    


 


Acanthocytes (Spur)  Not Reportable   06/15/19  20:40    


 


Rouleaux  Not Reportable   06/15/19  20:40    


 


  Not Reportable   06/15/19  20:40    


 


  Not Reportable   06/15/19  20:40    


 


  Not Reportable   06/15/19  20:40    


 


  Not Reportable   06/15/19  20:40    


 


Hem Pathologist Commnt  No   06/15/19  20:40    


 


PT  14.8 Sec. (12.2-14.9)   06/15/19  21:10    


 


INR  1.19  (0.87-1.13)  H  06/15/19  21:10    


 


POC ABG pH  7.370  (7.35-7.45)   06/18/19  11:26    


 


POC ABG pCO2  65.6  (35-45)  H  06/18/19  11:26    


 


POC ABG pO2  50  ()  L  06/18/19  11:26    


 


POC ABG HCO3  37.9  (22-26 mml/L)   06/18/19  11:26    


 


POC ABG Total CO2  40  (23-27mmol/L)   06/18/19  11:26    


 


POC ABG O2 Sat  82   06/18/19  11:26    


 


POC ABG Base Excess  13  ((-2) - (+3)mmol/L)   06/18/19  11:26    


 


  21 %  06/18/19  11:26    


 


Sodium  139 mmol/L (137-145)   06/18/19  03:58    


 


Potassium  4.8 mmol/L (3.6-5.0)   06/18/19  03:58    


 


Chloride  95.7 mmol/L ()  L  06/18/19  03:58    


 


Carbon Dioxide  33 mmol/L (22-30)  H  06/18/19  03:58    


 


  15 mmol/L  06/18/19  03:58    


 


BUN  22 mg/dL (9-20)  H  06/18/19  03:58    


 


  0.7 mg/dL (0.8-1.5)  L  06/18/19  03:58    


 


Estimated GFR  > 60 ml/min  06/18/19  03:58    


 


  31 %  06/18/19  03:58    


 


Glucose  123 mg/dL ()  H  06/18/19  03:58    


 


Calcium  9.8 mg/dL (8.4-10.2)   06/18/19  03:58    


 


Magnesium  2.20 mg/dL (1.7-2.3)   06/15/19  21:10    


 


  103 units/L ()   06/15/19  21:10    














Active Medications





- Current Medications


Current Medications: 














Generic Name Dose Route Start Last Admin





  Trade Name Freq  PRN Reason Stop Dose Admin


 


Acetaminophen  650 mg  06/16/19 06:54 





  Tylenol  PO  





  Q6H PRN  





  Pain  


 


Albuterol  2.5 mg  06/16/19 07:21 





  Proventil  IH  





  Q4HRT PRN  





  Shortness Of Breath  


 


Albuterol/Ipratropium  1 ampul  06/18/19 08:00  06/19/19 20:19





  Duoneb *Not For Prn Use*  IH   1 ampul





  TIDRT KENDRA   Administration


 


Enoxaparin Sodium  40 mg  06/17/19 22:00  06/19/19 21:37





  Lovenox  SUB-Q   40 mg





  QDAY@2200 KENDRA   Administration


 


Guaifenesin  600 mg  06/17/19 22:40  06/19/19 21:37





  Mucinex Er  PO   600 mg





  BID KENDRA   Administration


 


Ibuprofen  600 mg  06/16/19 06:54  06/17/19 21:35





  Ibuprofen  PO   600 mg





  Q8H PRN   Administration





  Pain  


 


Levofloxacin  750 mg  06/20/19 18:00 





  Levaquin  PO  





  QPM [facility]  


 


Methylprednisolone Sodium Succinate  40 mg  06/17/19 20:00  06/20/19 02:12





  Solu-Medrol  IV   40 mg





  Q6H KENDRA   Administration


 


Ondansetron HCl  4 mg  06/15/19 22:33 





  Zofran  IV  





  Q8H PRN  





  Nausea And Vomiting  


 


Sodium Chloride  10 ml  06/15/19 23:00  06/19/19 21:38





  Sodium Chloride Flush Syringe 10 Ml  IV   10 ml





  BID KENDRA   Administration


 


Sodium Chloride  10 ml  06/15/19 22:33  06/19/19 16:49





  Sodium Chloride Flush Syringe 10 Ml  IV   10 ml





  PRN PRN   Administration





  LINE FLUSH  














Nutrition/Malnutrition Assess





- Dietary Evaluation


Nutrition/Malnutrition Findings: 


                                        





Nutrition Notes                                            Start:  06/17/19 

17:35


Freq:                                                      Status: Active       




Protocol:                                                                       




 Document     06/17/19 17:35  RM  (Rec: 06/17/19 17:44  RM  [id number])


 Nutrition Notes


     Need for Assessment generated from:         MD Michelle,MST


     Initial or Follow up                        Assessment


     Current Diagnosis                           COPD,Hypertension


     Current Diet                                Regular w/Ensure Enlive


                                                 Vanilla BID


     Labs/Tests                                  Reviewed


     Pertinent Medications                       Solu-Medrol


     Height                                      5 ft 3 in


     Weight                                      47.6 kg


     Usual Body Weight                           54.55 kg


     Ideal Body Weight (kg)                      56.36


     BMI                                         18.6


     Weight change and time frame                12.7% wt loss X 6 months


     Subjective/Other Information                Consulted for malnutrition and


                                                 ONS diet.


                                                 Pt stated that PTA he ate 3


                                                 meals daily. Stated that he


                                                 eats all of his meals here and


                                                 that he drinks the Ensure


                                                 Enlive. Noted 2 unopened


                                                 Ensure Enlive at bedside. Pt


                                                 stated that he has not gotten


                                                 around to drinking them but


                                                 will today. Stated UBW was 120


                                                 lbs 6 months ago.


                                                 No temporal or orbital wasting


                                                 .


     Percent of energy/protein needs met:        100%/100%


     Burn                                        Absent


     Trauma                                      Absent


     #2


      Nutrition Diagnosis                        Underweight


      Comments:                                  for older adult


      Etiology                                   COPD


      As Evidenced by Signs and Symptoms         BMI 18.6


     #1


      Nutrition Diagnosis                        Unintended weight loss


      Etiology                                   COPD


      As Evidenced by Signs and Symptoms         12.7% wt loss X 6 months


     Is patient on ventilator?                   No


     Is Patient Ambulatory and/or Out of Bed     Yes


     REE-([facility]-ambulatory/OOB) [     1496.469


      [email or web address]]                               


     Kcal/Kg value to use for calculation        38


     Approximate Energy Requirements Using       1809


      kcal/Kg                                    


     Calculation Used for Recommendations        Kcal/kg


     Additional Notes                            Protein Needs: 57-71g (1.2-1.


                                                 5g/kg)


                                                 Fluid Needs: 1 ml/kcal


 Nutrition Intervention


     Change Diet Order:                          Continue current


     Add Supplement/Snack (indicate name/kcal    Ensure Enlive Vanilla BID


      /protein )                                 


     Provides kCal:                              700


     Provides Protein (gm)                       40


     Goal #1                                     Continue to meet least 75% of


                                                 calorie and protein needs via


                                                 PO and ONS intakes


     Goal #2                                     Wt gain/maintenance


     Anticipated Discharge Needs:                Regular diet


     Follow-Up By:                               06/24/19


     Additional Comments                         Follow for PO and ONS intakes
